# Patient Record
Sex: FEMALE | Race: OTHER | Employment: UNEMPLOYED | URBAN - METROPOLITAN AREA
[De-identification: names, ages, dates, MRNs, and addresses within clinical notes are randomized per-mention and may not be internally consistent; named-entity substitution may affect disease eponyms.]

---
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
## 2018-02-19 ENCOUNTER — HOSPITAL ENCOUNTER (OUTPATIENT)
Age: 18
Discharge: HOME | End: 2018-02-19
Payer: COMMERCIAL

## 2018-02-19 VITALS
HEIGHT: 62 IN | WEIGHT: 135 LBS | RESPIRATION RATE: 18 BRPM | HEART RATE: 101 BPM | OXYGEN SATURATION: 99 % | DIASTOLIC BLOOD PRESSURE: 67 MMHG | BODY MASS INDEX: 24.84 KG/M2 | SYSTOLIC BLOOD PRESSURE: 116 MMHG | TEMPERATURE: 98 F

## 2018-02-19 DIAGNOSIS — K52.9 GASTROENTERITIS: Primary | ICD-10-CM

## 2018-02-19 LAB
B-HCG UR QL: NEGATIVE
S PYO AG THROAT QL: NEGATIVE

## 2018-02-19 PROCEDURE — 81025 URINE PREGNANCY TEST: CPT

## 2018-02-19 PROCEDURE — 99214 OFFICE O/P EST MOD 30 MIN: CPT

## 2018-02-19 PROCEDURE — 87430 STREP A AG IA: CPT

## 2018-02-19 PROCEDURE — 99213 OFFICE O/P EST LOW 20 MIN: CPT

## 2018-02-19 RX ORDER — ONDANSETRON 4 MG/1
4 TABLET, ORALLY DISINTEGRATING ORAL ONCE
Status: COMPLETED | OUTPATIENT
Start: 2018-02-19 | End: 2018-02-19

## 2018-02-19 RX ORDER — ONDANSETRON 4 MG/1
4 TABLET, ORALLY DISINTEGRATING ORAL EVERY 8 HOURS PRN
Qty: 10 TABLET | Refills: 0 | Status: SHIPPED | OUTPATIENT
Start: 2018-02-19 | End: 2018-02-26

## 2018-02-19 NOTE — ED INITIAL ASSESSMENT (HPI)
Pt reporting nausea/vomiting starting last noc. States emesis x3 with last being approximately 30 min ago. States she had a sore throat and cough last week. Denies fever.

## 2018-02-19 NOTE — ED PROVIDER NOTES
Patient Seen in: [facility]    History   Patient presents with:  Nausea/vomiting  Headache    Stated Complaint: VOMITING    HPI    Patient is an otherwise healthy 64-year-old female who presents for evaluation of nausea a 98.2 °F (36.8 °C)  Temp src: Oral  SpO2: 99 %  O2 Device: None (Room air)    Current:/67   Pulse 101   Temp 98.2 °F (36.8 °C) (Oral)   Resp 18   Ht 157.5 cm (5' 2\")   Wt 61.2 kg   LMP 02/11/2018   SpO2 99%   Breastfeeding?  Yes   BMI 24.69 kg/m² because her boyfriend has to go to work. Zofran given here for nausea. Patient requesting discharge papers so she can leave. Given good ER precautions for any worsening symptoms. All questions answered prior to discharge.   Recommended close PCP follow-

## 2018-10-13 ENCOUNTER — OFFICE VISIT (OUTPATIENT)
Dept: OBGYN CLINIC | Facility: CLINIC | Age: 18
End: 2018-10-13
Payer: COMMERCIAL

## 2018-10-13 DIAGNOSIS — N92.6 MISSED MENSES: Primary | ICD-10-CM

## 2018-10-15 ENCOUNTER — TELEPHONE (OUTPATIENT)
Dept: OBGYN CLINIC | Facility: CLINIC | Age: 18
End: 2018-10-15

## 2018-10-15 PROBLEM — N89.8 VAGINAL DISCHARGE: Status: RESOLVED | Noted: 2017-03-29 | Resolved: 2018-10-15

## 2018-10-15 PROBLEM — O09.899 HIGH RISK TEEN PREGNANCY: Status: RESOLVED | Noted: 2017-05-01 | Resolved: 2018-10-15

## 2018-10-15 PROBLEM — Z87.51 HISTORY OF PRETERM DELIVERY: Status: ACTIVE | Noted: 2018-10-15

## 2018-10-15 PROBLEM — Z37.9 NORMAL LABOR: Status: RESOLVED | Noted: 2017-10-29 | Resolved: 2018-10-15

## 2018-10-15 PROBLEM — O09.899 HIGH RISK TEEN PREGNANCY (HCC): Status: RESOLVED | Noted: 2017-05-01 | Resolved: 2018-10-15

## 2018-10-15 PROBLEM — Z34.90 PREGNANCY: Status: RESOLVED | Noted: 2017-10-26 | Resolved: 2018-10-15

## 2018-10-15 PROBLEM — N92.0 SPOTTING: Status: RESOLVED | Noted: 2017-02-25 | Resolved: 2018-10-15

## 2018-10-15 PROBLEM — Z37.9 NORMAL LABOR (HCC): Status: RESOLVED | Noted: 2017-10-29 | Resolved: 2018-10-15

## 2018-10-15 PROBLEM — R10.2 PELVIC PAIN: Status: RESOLVED | Noted: 2017-02-25 | Resolved: 2018-10-15

## 2018-10-15 PROBLEM — Z34.90 PREGNANCY (HCC): Status: RESOLVED | Noted: 2017-10-26 | Resolved: 2018-10-15

## 2018-10-15 NOTE — PROGRESS NOTES
18 yo  here at 20 weeks. Just moved from [place] because her family lives here  Desires Midwifery care. Pt denies any medical conditions. First birth complicated by  PROM at 36+5 EGA. [facility]

## 2018-10-23 ENCOUNTER — TELEPHONE (OUTPATIENT)
Dept: OBGYN CLINIC | Facility: CLINIC | Age: 18
End: 2018-10-23

## 2018-10-23 ENCOUNTER — NURSE ONLY (OUTPATIENT)
Dept: OBGYN CLINIC | Facility: CLINIC | Age: 18
End: 2018-10-23
Payer: COMMERCIAL

## 2018-10-23 VITALS — HEIGHT: 61.5 IN | BODY MASS INDEX: 25.84 KG/M2 | WEIGHT: 138.63 LBS

## 2018-10-23 DIAGNOSIS — O09.899 HIGH RISK TEEN PREGNANCY, ANTEPARTUM: Primary | ICD-10-CM

## 2018-10-23 DIAGNOSIS — O09.219 PREVIOUS PRETERM DELIVERY, ANTEPARTUM: ICD-10-CM

## 2018-10-23 PROCEDURE — 99211 OFF/OP EST MAY X REQ PHY/QHP: CPT | Performed by: ADVANCED PRACTICE MIDWIFE

## 2018-10-23 RX ORDER — CHOLECALCIFEROL (VITAMIN D3) 25 MCG
1 TABLET,CHEWABLE ORAL DAILY
COMMUNITY
End: 2019-12-04

## 2018-10-23 NOTE — PROGRESS NOTES
Nurse education complete & information given to pt. Pt had one PN visit in [place]. Records reviewed, results entered & verified. U/S done at 17 2/7 weeks. HCV ordered. Pt moved here 3 weeks ago to be closer to family. Pt currently involved w/ [facility].

## 2018-10-24 ENCOUNTER — INITIAL PRENATAL (OUTPATIENT)
Dept: OBGYN CLINIC | Facility: CLINIC | Age: 18
End: 2018-10-24
Payer: COMMERCIAL

## 2018-10-24 VITALS
HEART RATE: 92 BPM | WEIGHT: 138 LBS | SYSTOLIC BLOOD PRESSURE: 118 MMHG | DIASTOLIC BLOOD PRESSURE: 75 MMHG | BODY MASS INDEX: 26 KG/M2

## 2018-10-24 DIAGNOSIS — Z34.82 ENCOUNTER FOR SUPERVISION OF OTHER NORMAL PREGNANCY IN SECOND TRIMESTER: Primary | ICD-10-CM

## 2018-10-24 PROBLEM — O09.32 INSUFFICIENT PRENATAL CARE IN SECOND TRIMESTER: Status: ACTIVE | Noted: 2018-10-24

## 2018-10-24 PROBLEM — O09.32 INSUFFICIENT PRENATAL CARE IN SECOND TRIMESTER (HCC): Status: ACTIVE | Noted: 2018-10-24

## 2018-10-24 PROBLEM — O09.899 SHORT INTERVAL BETWEEN PREGNANCIES AFFECTING PREGNANCY, ANTEPARTUM (HCC): Status: ACTIVE | Noted: 2018-10-24

## 2018-10-24 PROBLEM — O09.899 SHORT INTERVAL BETWEEN PREGNANCIES AFFECTING PREGNANCY, ANTEPARTUM: Status: ACTIVE | Noted: 2018-10-24

## 2018-10-24 PROCEDURE — 81002 URINALYSIS NONAUTO W/O SCOPE: CPT | Performed by: ADVANCED PRACTICE MIDWIFE

## 2018-10-24 PROCEDURE — 90686 IIV4 VACC NO PRSV 0.5 ML IM: CPT | Performed by: ADVANCED PRACTICE MIDWIFE

## 2018-10-24 PROCEDURE — 90471 IMMUNIZATION ADMIN: CPT | Performed by: ADVANCED PRACTICE MIDWIFE

## 2018-10-24 NOTE — PROGRESS NOTES
NOB labs reviewed  Records reviewed   Consultation with MFM for hx of PTB and U/S  Warning signs reviewed.   All questions answered

## 2018-10-25 NOTE — PROGRESS NOTES
Outpatient Maternal-Fetal Medicine Consultation    Dear Ms. Fabian Alvarado    Thank you for requesting ultrasound evaluation and maternal fetal medicine consultation on your patient Yue Keenan.   As you are aware she is a 19year old female  with a General: alert and oriented,no acute distress  Abdomen: gravid, soft, non-tender  Extremities: non-tender, no edema    OBSTETRIC ULTRASOUND  The patient had a cervical length assessment today with a normal cervical length (3.9 cm) without cervical funnel Risk factors associated with spontaneous  labor and delivery include maternal medical or obstetrical conditions and demographic variables such as age, race, employment, and socioeconomic status.    Multiple births are six times as likely to be pr factor for  birth. The highest risk of  birth appears to be with interpregnancy interval of less than six months. We discussed the morbidity and mortality associated with prematurity at various gestational ages.   The signs and

## 2018-10-29 ENCOUNTER — TELEPHONE (OUTPATIENT)
Dept: OBGYN CLINIC | Facility: CLINIC | Age: 18
End: 2018-10-29

## 2018-10-29 ENCOUNTER — HOSPITAL ENCOUNTER (OUTPATIENT)
Dept: PERINATAL CARE | Facility: HOSPITAL | Age: 18
Discharge: HOME | End: 2018-10-29
Attending: ADVANCED PRACTICE MIDWIFE
Payer: COMMERCIAL

## 2018-10-29 ENCOUNTER — HOSPITAL ENCOUNTER (OUTPATIENT)
Dept: PERINATAL CARE | Facility: HOSPITAL | Age: 18
Discharge: HOME | End: 2018-10-29
Attending: OBSTETRICS & GYNECOLOGY
Payer: COMMERCIAL

## 2018-10-29 VITALS
HEART RATE: 96 BPM | SYSTOLIC BLOOD PRESSURE: 127 MMHG | WEIGHT: 138 LBS | HEIGHT: 61.5 IN | BODY MASS INDEX: 25.72 KG/M2 | DIASTOLIC BLOOD PRESSURE: 71 MMHG

## 2018-10-29 DIAGNOSIS — Z87.51 HISTORY OF PRETERM DELIVERY: ICD-10-CM

## 2018-10-29 DIAGNOSIS — O09.899 SHORT INTERVAL BETWEEN PREGNANCIES AFFECTING PREGNANCY, ANTEPARTUM: ICD-10-CM

## 2018-10-29 DIAGNOSIS — Z87.51 HISTORY OF PRETERM DELIVERY: Primary | ICD-10-CM

## 2018-10-29 PROCEDURE — 76817 TRANSVAGINAL US OBSTETRIC: CPT | Performed by: OBSTETRICS & GYNECOLOGY

## 2018-10-29 PROCEDURE — 99241 OFFICE CONSULTATION,LEVEL I: CPT | Performed by: OBSTETRICS & GYNECOLOGY

## 2018-10-29 NOTE — TELEPHONE ENCOUNTER
[facility] call from [facility] stating pt has appt for todady at 11 AM for MFM consult and US for Cervical Length due to history of  delivery. [facility] is requesting order for Level II and Prior Auth.   [facility] was advised the order and  the Prior Auth pt has is for C

## 2018-11-21 ENCOUNTER — ROUTINE PRENATAL (OUTPATIENT)
Dept: OBGYN CLINIC | Facility: CLINIC | Age: 18
End: 2018-11-21
Payer: COMMERCIAL

## 2018-11-21 VITALS
SYSTOLIC BLOOD PRESSURE: 115 MMHG | HEART RATE: 94 BPM | BODY MASS INDEX: 26 KG/M2 | DIASTOLIC BLOOD PRESSURE: 70 MMHG | WEIGHT: 142 LBS

## 2018-11-21 DIAGNOSIS — Z34.82 ENCOUNTER FOR SUPERVISION OF OTHER NORMAL PREGNANCY IN SECOND TRIMESTER: Primary | ICD-10-CM

## 2018-11-21 PROCEDURE — 81002 URINALYSIS NONAUTO W/O SCOPE: CPT | Performed by: ADVANCED PRACTICE MIDWIFE

## 2018-11-26 ENCOUNTER — APPOINTMENT (OUTPATIENT)
Dept: LAB | Age: 18
End: 2018-11-26
Attending: ADVANCED PRACTICE MIDWIFE
Payer: COMMERCIAL

## 2018-11-26 DIAGNOSIS — O09.899 HIGH RISK TEEN PREGNANCY, ANTEPARTUM: ICD-10-CM

## 2018-11-26 DIAGNOSIS — O09.219 PREVIOUS PRETERM DELIVERY, ANTEPARTUM: ICD-10-CM

## 2018-11-26 DIAGNOSIS — Z34.82 ENCOUNTER FOR SUPERVISION OF OTHER NORMAL PREGNANCY IN SECOND TRIMESTER: ICD-10-CM

## 2018-11-26 PROCEDURE — 86803 HEPATITIS C AB TEST: CPT

## 2018-11-26 PROCEDURE — 86780 TREPONEMA PALLIDUM: CPT

## 2018-11-26 PROCEDURE — 36415 COLL VENOUS BLD VENIPUNCTURE: CPT

## 2018-11-26 PROCEDURE — 85027 COMPLETE CBC AUTOMATED: CPT

## 2018-11-26 PROCEDURE — 87389 HIV-1 AG W/HIV-1&-2 AB AG IA: CPT

## 2018-11-26 PROCEDURE — 82950 GLUCOSE TEST: CPT

## 2018-12-03 ENCOUNTER — HOSPITAL ENCOUNTER (OUTPATIENT)
Dept: PERINATAL CARE | Facility: HOSPITAL | Age: 18
Discharge: HOME | End: 2018-12-03
Attending: OBSTETRICS & GYNECOLOGY
Payer: COMMERCIAL

## 2018-12-03 VITALS
HEIGHT: 61.5 IN | HEART RATE: 86 BPM | WEIGHT: 142 LBS | DIASTOLIC BLOOD PRESSURE: 78 MMHG | SYSTOLIC BLOOD PRESSURE: 116 MMHG | BODY MASS INDEX: 26.47 KG/M2

## 2018-12-03 DIAGNOSIS — Z36.3 ENCOUNTER FOR ANTENATAL SCREENING FOR MALFORMATION: ICD-10-CM

## 2018-12-03 DIAGNOSIS — Z87.51 HISTORY OF PRETERM DELIVERY: Primary | ICD-10-CM

## 2018-12-03 DIAGNOSIS — Z87.51 HISTORY OF PRETERM DELIVERY: ICD-10-CM

## 2018-12-03 PROCEDURE — 99243 OFF/OP CNSLTJ NEW/EST LOW 30: CPT | Performed by: OBSTETRICS & GYNECOLOGY

## 2018-12-03 PROCEDURE — 76811 OB US DETAILED SNGL FETUS: CPT | Performed by: OBSTETRICS & GYNECOLOGY

## 2018-12-03 NOTE — PROGRESS NOTES
Outpatient Maternal-Fetal Medicine Follow-Up    Dear Ms. Anh Sherman    Thank you for requesting ultrasound evaluation and maternal fetal medicine consultation on your patient Love Gong.   As you are aware she is a 19year old female  with a s if additional questions or concerns arise. Kat Albarran. Farrah Simmons M.D. The majority of the time (>50%) was spent in review of records, consultation and coordination of care. Our discussion is summarized above.  The approximate physician face-to-face time

## 2018-12-19 ENCOUNTER — ROUTINE PRENATAL (OUTPATIENT)
Dept: OBGYN CLINIC | Facility: CLINIC | Age: 18
End: 2018-12-19
Payer: COMMERCIAL

## 2018-12-19 VITALS
WEIGHT: 150 LBS | HEART RATE: 90 BPM | SYSTOLIC BLOOD PRESSURE: 113 MMHG | DIASTOLIC BLOOD PRESSURE: 72 MMHG | BODY MASS INDEX: 28 KG/M2

## 2018-12-19 DIAGNOSIS — Z34.83 ENCOUNTER FOR SUPERVISION OF OTHER NORMAL PREGNANCY IN THIRD TRIMESTER: Primary | ICD-10-CM

## 2018-12-19 PROCEDURE — 81002 URINALYSIS NONAUTO W/O SCOPE: CPT | Performed by: ADVANCED PRACTICE MIDWIFE

## 2018-12-19 NOTE — PROGRESS NOTES
Active fetus  No signs signs of PTL. Reviewed S&S of PTL Discussed with pt how to apply for Link. Also discussed [facility] applying for [facility] letter of confirmation Aristides Last. Warning signs reviewed  All questions answered.

## 2019-01-16 ENCOUNTER — ROUTINE PRENATAL (OUTPATIENT)
Dept: OBGYN CLINIC | Facility: CLINIC | Age: 19
End: 2019-01-16
Payer: COMMERCIAL

## 2019-01-16 VITALS
DIASTOLIC BLOOD PRESSURE: 71 MMHG | HEART RATE: 87 BPM | BODY MASS INDEX: 28.42 KG/M2 | HEIGHT: 61.5 IN | WEIGHT: 152.5 LBS | SYSTOLIC BLOOD PRESSURE: 109 MMHG

## 2019-01-16 DIAGNOSIS — Z34.83 PRENATAL CARE, SUBSEQUENT PREGNANCY, THIRD TRIMESTER: Primary | ICD-10-CM

## 2019-01-16 LAB
APPEARANCE: CLEAR
MULTISTIX LOT#: NORMAL NUMERIC
PH, URINE: 7 (ref 4.5–8)
SPECIFIC GRAVITY: 1.02 (ref 1–1.03)
URINE-COLOR: YELLOW
UROBILINOGEN,SEMI-QN: 0.2 MG/DL (ref 0–1.9)

## 2019-01-16 PROCEDURE — 81002 URINALYSIS NONAUTO W/O SCOPE: CPT | Performed by: ADVANCED PRACTICE MIDWIFE

## 2019-01-16 NOTE — PROGRESS NOTES
Active baby. Denies any vagina bleeding or leaking of fluid. Had a small amount of bright red blood from vagina 2 days while urinating. Resolved and no further bleeding. Denies any associated abdominal pain or cramping. Denies any urinary s/s.  Pt states

## 2019-01-28 ENCOUNTER — HOSPITAL ENCOUNTER (OUTPATIENT)
Facility: HOSPITAL | Age: 19
Setting detail: OBSERVATION
Discharge: HOME | End: 2019-01-29
Attending: ADVANCED PRACTICE MIDWIFE | Admitting: OBSTETRICS & GYNECOLOGY
Payer: COMMERCIAL

## 2019-01-28 ENCOUNTER — ROUTINE PRENATAL (OUTPATIENT)
Dept: OBGYN CLINIC | Facility: CLINIC | Age: 19
End: 2019-01-28
Payer: COMMERCIAL

## 2019-01-28 VITALS
SYSTOLIC BLOOD PRESSURE: 102 MMHG | BODY MASS INDEX: 28.19 KG/M2 | DIASTOLIC BLOOD PRESSURE: 69 MMHG | HEIGHT: 61.5 IN | WEIGHT: 151.25 LBS | HEART RATE: 97 BPM

## 2019-01-28 DIAGNOSIS — Z34.83 PRENATAL CARE, SUBSEQUENT PREGNANCY, THIRD TRIMESTER: Primary | ICD-10-CM

## 2019-01-28 PROBLEM — Z34.90 PREGNANCY: Status: ACTIVE | Noted: 2019-01-28

## 2019-01-28 PROBLEM — Z34.90 PREGNANCY (HCC): Status: ACTIVE | Noted: 2019-01-28

## 2019-01-28 PROBLEM — O47.03 PRETERM UTERINE CONTRACTIONS IN THIRD TRIMESTER, ANTEPARTUM: Status: ACTIVE | Noted: 2019-01-28

## 2019-01-28 PROBLEM — O47.03 PRETERM UTERINE CONTRACTIONS IN THIRD TRIMESTER, ANTEPARTUM (HCC): Status: ACTIVE | Noted: 2019-01-28

## 2019-01-28 LAB
ANTIBODY SCREEN: NEGATIVE
BASOPHILS # BLD: 0 K/UL (ref 0–0.2)
BASOPHILS NFR BLD: 0 %
BILIRUB UR QL: NEGATIVE
CLARITY UR: CLEAR
COLOR UR: YELLOW
EOSINOPHIL # BLD: 0 K/UL (ref 0–0.7)
EOSINOPHIL NFR BLD: 0 %
ERYTHROCYTE [DISTWIDTH] IN BLOOD BY AUTOMATED COUNT: 12.1 % (ref 11–15)
GLUCOSE UR-MCNC: NEGATIVE MG/DL
HCT VFR BLD AUTO: 38.6 % (ref 35–48)
HGB BLD-MCNC: 13.4 G/DL (ref 12–16)
HGB UR QL STRIP.AUTO: NEGATIVE
KETONES UR-MCNC: NEGATIVE MG/DL
LYMPHOCYTES # BLD: 1.3 K/UL (ref 1–4)
LYMPHOCYTES NFR BLD: 13 %
MCH RBC QN AUTO: 31.4 PG (ref 27–32)
MCHC RBC AUTO-ENTMCNC: 34.6 G/DL (ref 32–37)
MCV RBC AUTO: 90.7 FL (ref 80–100)
MONOCYTES # BLD: 0.4 K/UL (ref 0–1)
MONOCYTES NFR BLD: 4 %
NEUTROPHILS # BLD AUTO: 8.2 K/UL (ref 1.8–7.7)
NEUTROPHILS NFR BLD: 83 %
NITRITE UR QL STRIP.AUTO: NEGATIVE
PH UR: 5 [PH] (ref 5–8)
PLATELET # BLD AUTO: 230 K/UL (ref 140–400)
PMV BLD AUTO: 9.6 FL (ref 7.4–10.3)
PROT UR-MCNC: NEGATIVE MG/DL
RBC # BLD AUTO: 4.25 M/UL (ref 3.7–5.4)
RBC #/AREA URNS AUTO: 3 /HPF
RH BLOOD TYPE: POSITIVE
SP GR UR STRIP: 1.02 (ref 1–1.03)
UROBILINOGEN UR STRIP-ACNC: <2
VIT C UR-MCNC: NEGATIVE MG/DL
WBC # BLD AUTO: 10 K/UL (ref 4–11)
WBC #/AREA URNS AUTO: 8 /HPF

## 2019-01-28 PROCEDURE — 81002 URINALYSIS NONAUTO W/O SCOPE: CPT | Performed by: ADVANCED PRACTICE MIDWIFE

## 2019-01-28 PROCEDURE — 99219 INITIAL OBSERVATION CARE,LEVL II: CPT | Performed by: ADVANCED PRACTICE MIDWIFE

## 2019-01-28 RX ORDER — SODIUM CHLORIDE, SODIUM LACTATE, POTASSIUM CHLORIDE, CALCIUM CHLORIDE 600; 310; 30; 20 MG/100ML; MG/100ML; MG/100ML; MG/100ML
INJECTION, SOLUTION INTRAVENOUS CONTINUOUS
Status: DISCONTINUED | OUTPATIENT
Start: 2019-01-28 | End: 2019-01-29

## 2019-01-28 RX ORDER — NIFEDIPINE 10 MG/1
CAPSULE ORAL
Status: COMPLETED
Start: 2019-01-28 | End: 2019-01-28

## 2019-01-28 RX ORDER — SODIUM CHLORIDE, SODIUM LACTATE, POTASSIUM CHLORIDE, CALCIUM CHLORIDE 600; 310; 30; 20 MG/100ML; MG/100ML; MG/100ML; MG/100ML
INJECTION, SOLUTION INTRAVENOUS
Status: DISPENSED
Start: 2019-01-28 | End: 2019-01-29

## 2019-01-28 RX ORDER — SODIUM CHLORIDE 0.9 % (FLUSH) 0.9 %
10 SYRINGE (ML) INJECTION AS NEEDED
Status: DISCONTINUED | OUTPATIENT
Start: 2019-01-28 | End: 2019-01-29

## 2019-01-28 RX ORDER — ZOLPIDEM TARTRATE 5 MG/1
5 TABLET ORAL NIGHTLY PRN
Status: DISCONTINUED | OUTPATIENT
Start: 2019-01-28 | End: 2019-01-29

## 2019-01-28 RX ORDER — SODIUM CHLORIDE, SODIUM LACTATE, POTASSIUM CHLORIDE, CALCIUM CHLORIDE 600; 310; 30; 20 MG/100ML; MG/100ML; MG/100ML; MG/100ML
INJECTION, SOLUTION INTRAVENOUS
Status: COMPLETED
Start: 2019-01-28 | End: 2019-01-28

## 2019-01-28 RX ORDER — BETAMETHASONE SODIUM PHOSPHATE AND BETAMETHASONE ACETATE 3; 3 MG/ML; MG/ML
INJECTION, SUSPENSION INTRA-ARTICULAR; INTRALESIONAL; INTRAMUSCULAR; SOFT TISSUE
Status: COMPLETED
Start: 2019-01-28 | End: 2019-01-28

## 2019-01-28 RX ORDER — ACETAMINOPHEN 500 MG
500 TABLET ORAL EVERY 6 HOURS PRN
Status: DISCONTINUED | OUTPATIENT
Start: 2019-01-28 | End: 2019-01-29

## 2019-01-28 RX ORDER — NIFEDIPINE 10 MG/1
20 CAPSULE ORAL EVERY 6 HOURS
Status: DISCONTINUED | OUTPATIENT
Start: 2019-01-28 | End: 2019-01-29

## 2019-01-28 RX ORDER — NIFEDIPINE 10 MG/1
10 CAPSULE ORAL
Status: ACTIVE | OUTPATIENT
Start: 2019-01-28 | End: 2019-01-28

## 2019-01-28 RX ORDER — DOCUSATE SODIUM 100 MG/1
100 CAPSULE, LIQUID FILLED ORAL 2 TIMES DAILY
Status: DISCONTINUED | OUTPATIENT
Start: 2019-01-28 | End: 2019-01-29

## 2019-01-28 RX ORDER — NIFEDIPINE 10 MG/1
CAPSULE ORAL
Status: DISCONTINUED
Start: 2019-01-28 | End: 2019-01-29

## 2019-01-28 RX ORDER — BETAMETHASONE SODIUM PHOSPHATE AND BETAMETHASONE ACETATE 3; 3 MG/ML; MG/ML
12 INJECTION, SUSPENSION INTRA-ARTICULAR; INTRALESIONAL; INTRAMUSCULAR; SOFT TISSUE EVERY 24 HOURS
Status: COMPLETED | OUTPATIENT
Start: 2019-01-28 | End: 2019-01-29

## 2019-01-28 RX ORDER — CALCIUM CARBONATE 200(500)MG
1000 TABLET,CHEWABLE ORAL
Status: DISCONTINUED | OUTPATIENT
Start: 2019-01-28 | End: 2019-01-29

## 2019-01-28 RX ORDER — ACETAMINOPHEN 500 MG
1000 TABLET ORAL EVERY 6 HOURS PRN
Status: DISCONTINUED | OUTPATIENT
Start: 2019-01-28 | End: 2019-01-29

## 2019-01-28 NOTE — PROGRESS NOTES
Pt ambulatory to triage rm 3   Pt  hx of ptl. Sent from office d/t c/o lower pelvic pressure/back pain. Fetal tachycardia in office per CNM.  Sent in for r/o contractions and nst

## 2019-01-28 NOTE — PROGRESS NOTES
Reports pelvic pressure and some lower menstrual like cramps earlier today that have now resolved. Reports losing her mucus plug, denies LOF or bleeding. Denies regular contractions or BH. Reports active baby.   SVE posterior 1/75/-1, -190s with v

## 2019-01-29 VITALS
TEMPERATURE: 98 F | RESPIRATION RATE: 16 BRPM | SYSTOLIC BLOOD PRESSURE: 119 MMHG | DIASTOLIC BLOOD PRESSURE: 68 MMHG | HEART RATE: 99 BPM

## 2019-01-29 LAB
C TRACH DNA SPEC QL NAA+PROBE: NEGATIVE
N GONORRHOEA DNA SPEC QL NAA+PROBE: NEGATIVE

## 2019-01-29 PROCEDURE — 99217 OBSERVATION CARE DISCHARGE: CPT | Performed by: ADVANCED PRACTICE MIDWIFE

## 2019-01-29 RX ORDER — SODIUM CHLORIDE, SODIUM LACTATE, POTASSIUM CHLORIDE, CALCIUM CHLORIDE 600; 310; 30; 20 MG/100ML; MG/100ML; MG/100ML; MG/100ML
INJECTION, SOLUTION INTRAVENOUS
Status: DISCONTINUED
Start: 2019-01-29 | End: 2019-01-29 | Stop reason: WASHOUT

## 2019-01-29 RX ORDER — SODIUM CHLORIDE, SODIUM LACTATE, POTASSIUM CHLORIDE, CALCIUM CHLORIDE 600; 310; 30; 20 MG/100ML; MG/100ML; MG/100ML; MG/100ML
INJECTION, SOLUTION INTRAVENOUS
Status: COMPLETED
Start: 2019-01-29 | End: 2019-01-29

## 2019-01-29 NOTE — PROGRESS NOTES
[facility]    OB/GYNE Progress Note      Whit Garcia Patient Status:  Observation    10/9/2000 MRN [id number]   Location [address]  Attending Yanni Montes, [facility] Day # 0 PCP Lissett Benjamin MD 05/03/2017    ABO O 01/28/2019    RH Positive 01/28/2019    WBC 10.0 01/28/2019    HGB 13.4 01/28/2019    HCT 38.6 01/28/2019     01/28/2019       Lab Results   Component Value Date    COLORUR Yellow 01/28/2019    CLARITY Clear 01/28/2019    SPECGRA

## 2019-01-29 NOTE — CONSULTS
[facility]    Maternal-Fetal Medicine Inpatient Consultation    Date of Admission:  2019  Date of Consult:  2019    Reason for Consult:   Threatened  Labor    History of Present Illness:  Edvin Herrera is a a(n) 18 yesiri Facility-Administered Medications:   •  lactated ringers infusion, , Intravenous, Continuous  •  Normal Saline Flush 0.9 % injection 10 mL, 10 mL, Intravenous, PRN  •  lactated ringers infusion, , Intravenous, Continuous  •  acetaminophen ([address] approximately 50% of these  births.  Although the causes of  labor are not well understood, the burden of  births is clear— births account for approximately 70% of  deaths and 36% of infant deaths as well as 25–50% of ainsley prevent  births in women with  labor have included bed rest, abstention from intercourse and orgasm, and hydration. Evidence for the effectiveness of these interventions is lacking, and adverse effects have been reported.  therapeutic agents c administration)  · Maternal contraindications to tocolysis (agent specific)     contractions are common; however, these contractions do not reliably predict which women will have subsequent progressive cervical change.  In a study of 763 women who yanez regularly scheduled repeat courses or multiple courses (more than two) are not currently recommended.     Because treatment with corticosteroids for less than 24 hours is still associated with significant reductions in  morbidity and mortality, a fi benefit. The use of magnesium sulfate to inhibit acute  labor has similar limitations when used for pregnancy prolongation.   However, if magnesium sulfate is being used in the context of  labor for fetal neuroprotection and the patient is shown to be effective for the prevention of  birth and should not be routinely recommended.  Furthermore, the potential harm, including venous thromboembolism, bone demineralization, and deconditioning, and the negative effects, such as loss of emplo

## 2019-01-29 NOTE — DISCHARGE SUMMARY
[facility]    Discharge Summary    Love Gong Patient Status:  Observation    10/9/2000 MRN [id number]   Location [facility] Attending Shaw Mendoza, [address] Day # 0       EDC: Estimated Date of Deli

## 2019-01-29 NOTE — PROGRESS NOTES
2nd dose of betamethasone administered to right lateral vastus lateralis. Pt tolerated well. Discharged to home per ambulatory in stable condition with written and verbal instructions. Patient verbalizes understanding of information given.

## 2019-01-29 NOTE — H&P
[facility] Patient Status:  Observation    10/9/2000 MRN [id number]   Location [address] [place] [address] Attending Aaron Payton, [address] Day # 0 PCP Dereck Rubinstein, MD     Date of Grandmother    • Other (Other) Maternal Grandfather         arthritis   • Heart Disorder Paternal Grandfather    • Heart Attack Neg         family h/o Sudden cardiac death     Social History: Social History    Tobacco Use      Smoking status: Never Smoker 01/28/2019    UASA Negative 01/28/2019               Assessment/Plan:    Srinivasa Gaston is at an estimated gestational age of 32w1d with an estimated date of delivery of:  3/2/2019, by Last Menstrual Period      Obstetrical history significant for  pre

## 2019-01-30 LAB — T PALLIDUM AB SER QL: NEGATIVE

## 2019-02-04 ENCOUNTER — ROUTINE PRENATAL (OUTPATIENT)
Dept: OBGYN CLINIC | Facility: CLINIC | Age: 19
End: 2019-02-04
Payer: COMMERCIAL

## 2019-02-04 VITALS
BODY MASS INDEX: 28.35 KG/M2 | DIASTOLIC BLOOD PRESSURE: 73 MMHG | HEART RATE: 93 BPM | SYSTOLIC BLOOD PRESSURE: 108 MMHG | HEIGHT: 61.5 IN | WEIGHT: 152.13 LBS

## 2019-02-04 DIAGNOSIS — Z34.83 PRENATAL CARE, SUBSEQUENT PREGNANCY, THIRD TRIMESTER: Primary | ICD-10-CM

## 2019-02-04 LAB
APPEARANCE: CLEAR
APPEARANCE: CLEAR
MULTISTIX LOT#: NORMAL NUMERIC
MULTISTIX LOT#: NORMAL NUMERIC
PH, URINE: 5 (ref 4.5–8)
PH, URINE: 6 (ref 4.5–8)
SPECIFIC GRAVITY: 1 (ref 1–1.03)
SPECIFIC GRAVITY: 1.02 (ref 1–1.03)
URINE-COLOR: YELLOW
URINE-COLOR: YELLOW
UROBILINOGEN,SEMI-QN: 0.2 MG/DL (ref 0–1.9)
UROBILINOGEN,SEMI-QN: 0.2 MG/DL (ref 0–1.9)

## 2019-02-04 PROCEDURE — 81002 URINALYSIS NONAUTO W/O SCOPE: CPT | Performed by: ADVANCED PRACTICE MIDWIFE

## 2019-02-04 NOTE — PROGRESS NOTES
Active baby. Feeling well but tired. lower back pain and abdominal cramping resolved. Denies any vaginal bleeding or leaking of fluids. Denies BH. GBS neg, urine culture neg. Denies any urinary s/s. Taking prenatal classes weekly.  Birth plan reviewed; wou

## 2019-02-12 ENCOUNTER — ROUTINE PRENATAL (OUTPATIENT)
Dept: OBGYN CLINIC | Facility: CLINIC | Age: 19
End: 2019-02-12
Payer: COMMERCIAL

## 2019-02-12 VITALS
DIASTOLIC BLOOD PRESSURE: 70 MMHG | SYSTOLIC BLOOD PRESSURE: 118 MMHG | WEIGHT: 154 LBS | BODY MASS INDEX: 29 KG/M2 | HEART RATE: 80 BPM

## 2019-02-12 DIAGNOSIS — Z34.83 ENCOUNTER FOR SUPERVISION OF OTHER NORMAL PREGNANCY IN THIRD TRIMESTER: Primary | ICD-10-CM

## 2019-02-12 LAB
APPEARANCE: CLEAR
MULTISTIX LOT#: NORMAL NUMERIC
PH, URINE: 8 (ref 4.5–8)
SPECIFIC GRAVITY: 1 (ref 1–1.03)
URINE-COLOR: YELLOW
UROBILINOGEN,SEMI-QN: 0 MG/DL (ref 0–1.9)

## 2019-02-12 PROCEDURE — 81002 URINALYSIS NONAUTO W/O SCOPE: CPT | Performed by: ADVANCED PRACTICE MIDWIFE

## 2019-02-12 NOTE — PROGRESS NOTES
Baby active. No SOL. Plans routine baby meds. SOL and warning signs reviewed. Last baby PROM, had Pitocin 6 hrs after and then delivered 6 hr after Pitocin started.

## 2019-02-19 ENCOUNTER — ROUTINE PRENATAL (OUTPATIENT)
Dept: OBGYN CLINIC | Facility: CLINIC | Age: 19
End: 2019-02-19
Payer: COMMERCIAL

## 2019-02-19 ENCOUNTER — HOSPITAL ENCOUNTER (OUTPATIENT)
Facility: HOSPITAL | Age: 19
Setting detail: OBSERVATION
Discharge: HOME | End: 2019-02-19
Attending: ADVANCED PRACTICE MIDWIFE | Admitting: OBSTETRICS & GYNECOLOGY
Payer: COMMERCIAL

## 2019-02-19 VITALS
DIASTOLIC BLOOD PRESSURE: 72 MMHG | SYSTOLIC BLOOD PRESSURE: 106 MMHG | HEART RATE: 92 BPM | WEIGHT: 154 LBS | BODY MASS INDEX: 29 KG/M2

## 2019-02-19 VITALS
HEIGHT: 62 IN | HEART RATE: 117 BPM | SYSTOLIC BLOOD PRESSURE: 112 MMHG | RESPIRATION RATE: 18 BRPM | WEIGHT: 151 LBS | DIASTOLIC BLOOD PRESSURE: 58 MMHG | BODY MASS INDEX: 27.79 KG/M2 | TEMPERATURE: 99 F

## 2019-02-19 DIAGNOSIS — Z34.83 ENCOUNTER FOR SUPERVISION OF OTHER NORMAL PREGNANCY IN THIRD TRIMESTER: Primary | ICD-10-CM

## 2019-02-19 PROBLEM — R50.9 FEBRILE ILLNESS: Status: ACTIVE | Noted: 2019-02-19

## 2019-02-19 PROBLEM — E86.0 DEHYDRATION: Status: ACTIVE | Noted: 2019-02-19

## 2019-02-19 PROBLEM — N30.90 CYSTITIS: Status: ACTIVE | Noted: 2019-02-19

## 2019-02-19 LAB
APPEARANCE: CLEAR
BASOPHILS # BLD AUTO: 0.02 X10(3) UL (ref 0–0.2)
BASOPHILS NFR BLD AUTO: 0.2 %
BILIRUB UR QL: NEGATIVE
CLARITY UR: CLEAR
COLOR UR: YELLOW
DEPRECATED RDW RBC AUTO: 38.6 FL (ref 35.1–46.3)
EOSINOPHIL # BLD AUTO: 0 X10(3) UL (ref 0–0.7)
EOSINOPHIL NFR BLD AUTO: 0 %
ERYTHROCYTE [DISTWIDTH] IN BLOOD BY AUTOMATED COUNT: 12 % (ref 11–15)
FLUAV + FLUBV RNA SPEC NAA+PROBE: NEGATIVE
GLUCOSE UR-MCNC: NEGATIVE MG/DL
HCT VFR BLD AUTO: 33.3 % (ref 35–48)
HGB BLD-MCNC: 11.9 G/DL (ref 12–16)
HGB UR QL STRIP.AUTO: NEGATIVE
IMM GRANULOCYTES # BLD AUTO: 0.22 X10(3) UL (ref 0–1)
IMM GRANULOCYTES NFR BLD: 2.2 %
KETONES UR-MCNC: 20 MG/DL
LYMPHOCYTES # BLD AUTO: 0.79 X10(3) UL (ref 1–4)
LYMPHOCYTES NFR BLD AUTO: 7.9 %
MCH RBC QN AUTO: 31.4 PG (ref 26–34)
MCHC RBC AUTO-ENTMCNC: 35.7 G/DL (ref 31–37)
MCV RBC AUTO: 87.9 FL (ref 80–100)
MONOCYTES # BLD AUTO: 0.52 X10(3) UL (ref 0.1–1)
MONOCYTES NFR BLD AUTO: 5.2 %
MULTISTIX LOT#: NORMAL NUMERIC
NEUTROPHILS # BLD AUTO: 8.42 X10 (3) UL (ref 1.5–7.7)
NEUTROPHILS # BLD AUTO: 8.42 X10(3) UL (ref 1.5–7.7)
NEUTROPHILS NFR BLD AUTO: 84.5 %
NITRITE UR QL STRIP.AUTO: NEGATIVE
PH UR: 6 [PH] (ref 5–8)
PH, URINE: 7 (ref 4.5–8)
PLATELET # BLD AUTO: 186 10(3)UL (ref 150–450)
PROT UR-MCNC: NEGATIVE MG/DL
RBC # BLD AUTO: 3.79 X10(6)UL (ref 3.8–5.3)
RBC #/AREA URNS AUTO: 3 /HPF
SP GR UR STRIP: 1.01 (ref 1–1.03)
SPECIFIC GRAVITY: 1.02 (ref 1–1.03)
URINE-COLOR: YELLOW
UROBILINOGEN UR STRIP-ACNC: <2
UROBILINOGEN,SEMI-QN: 0 MG/DL (ref 0–1.9)
VIT C UR-MCNC: NEGATIVE MG/DL
WBC # BLD AUTO: 10 X10(3) UL (ref 4–11)
WBC #/AREA URNS AUTO: 9 /HPF

## 2019-02-19 PROCEDURE — 99219 INITIAL OBSERVATION CARE,LEVL II: CPT | Performed by: ADVANCED PRACTICE MIDWIFE

## 2019-02-19 PROCEDURE — 81002 URINALYSIS NONAUTO W/O SCOPE: CPT | Performed by: ADVANCED PRACTICE MIDWIFE

## 2019-02-19 PROCEDURE — 59025 FETAL NON-STRESS TEST: CPT | Performed by: ADVANCED PRACTICE MIDWIFE

## 2019-02-19 RX ORDER — AMOXICILLIN 500 MG/1
500 CAPSULE ORAL ONCE
Status: DISCONTINUED | OUTPATIENT
Start: 2019-02-19 | End: 2019-02-20

## 2019-02-19 RX ORDER — DEXTROSE, SODIUM CHLORIDE, SODIUM LACTATE, POTASSIUM CHLORIDE, AND CALCIUM CHLORIDE 5; .6; .31; .03; .02 G/100ML; G/100ML; G/100ML; G/100ML; G/100ML
INJECTION, SOLUTION INTRAVENOUS
Status: COMPLETED
Start: 2019-02-19 | End: 2019-02-19

## 2019-02-19 RX ORDER — SODIUM CHLORIDE, SODIUM LACTATE, POTASSIUM CHLORIDE, CALCIUM CHLORIDE 600; 310; 30; 20 MG/100ML; MG/100ML; MG/100ML; MG/100ML
INJECTION, SOLUTION INTRAVENOUS ONCE
Status: COMPLETED | OUTPATIENT
Start: 2019-02-19 | End: 2019-02-19

## 2019-02-19 RX ORDER — AMOXICILLIN 500 MG/1
CAPSULE ORAL
Status: COMPLETED
Start: 2019-02-19 | End: 2019-02-19

## 2019-02-19 RX ORDER — SODIUM CHLORIDE, SODIUM LACTATE, POTASSIUM CHLORIDE, CALCIUM CHLORIDE 600; 310; 30; 20 MG/100ML; MG/100ML; MG/100ML; MG/100ML
INJECTION, SOLUTION INTRAVENOUS
Status: DISCONTINUED
Start: 2019-02-19 | End: 2019-02-20

## 2019-02-19 RX ORDER — ACETAMINOPHEN 500 MG
1000 TABLET ORAL ONCE
Status: COMPLETED | OUTPATIENT
Start: 2019-02-19 | End: 2019-02-19

## 2019-02-19 RX ORDER — ACETAMINOPHEN 500 MG
TABLET ORAL
Status: DISCONTINUED
Start: 2019-02-19 | End: 2019-02-20

## 2019-02-19 RX ORDER — AMOXICILLIN 500 MG/1
500 CAPSULE ORAL 3 TIMES DAILY
Qty: 21 CAPSULE | Refills: 0 | Status: SHIPPED | OUTPATIENT
Start: 2019-02-19 | End: 2019-02-26

## 2019-02-19 RX ORDER — DEXTROSE AND SODIUM CHLORIDE 5; .45 G/100ML; G/100ML
INJECTION, SOLUTION INTRAVENOUS CONTINUOUS
Status: DISCONTINUED | OUTPATIENT
Start: 2019-02-19 | End: 2019-02-20

## 2019-02-20 ENCOUNTER — TELEPHONE (OUTPATIENT)
Dept: OBGYN CLINIC | Facility: CLINIC | Age: 19
End: 2019-02-20

## 2019-02-20 LAB
C TRACH DNA SPEC QL NAA+PROBE: NEGATIVE
N GONORRHOEA DNA SPEC QL NAA+PROBE: NEGATIVE

## 2019-02-20 NOTE — PROGRESS NOTES
Pt  @ 38.3 wks gest ambulatory to triage from [facility] office. Sent by CNM d/t elevated FHT's in office , pt c/o back pain, fever and chills denies s/s UTI, neg flank pain per CNM. Received order for NST, po hydrate and labs. Q&A done.  Pt agrees w/POC,

## 2019-02-20 NOTE — PROGRESS NOTES
Reports low back pain and pressure, constant. Denies dysuria. Urine with + Ketones and moderate leukocytes. Felt similar in labor with 1st baby. Appears sick. No CVA tenderness. Feels warm to touch, but temp normal here.  Pulse 92 by dynamap but manual was

## 2019-02-20 NOTE — TELEPHONE ENCOUNTER
Attempted to call pt to f/u on how feeling, however phone number out of service for both pt and FOB. Sent pt message through [employer].

## 2019-02-20 NOTE — TRIAGE
[facility]      Triage Note    Komal Mccoy Patient Status:  Observation    10/9/2000 MRN [id number]   Location [facility] [facility] Attending Swapna Garcia, [address] Day # 0 PCP MD Quentin Perez Interpretation: Reactive          FHR Category: Category I           Additional Comments Comments: REGAN Nguyen viewed strip and assessed pt, pt discharged home w/ written instructions from provider     Reason for visit:  Fever, backpain    Sabrinaonnie Dwaines

## 2019-02-20 NOTE — H&P
[facility] Patient Status:  Observation    10/9/2000 MRN [id number]   Location [address] [place] [address] Attending [address] Day # 0 Risa Vitale MD • Other (Other) Maternal Grandfather         arthritis   • Heart Disorder Paternal Grandfather    • Heart Attack Neg         family h/o Sudden cardiac death     Social History: Social History    Tobacco Use      Smoking status: Never Smoker      Smokeless PLT  186.0     Influenza A/B Negative  Urine culture and GC/Ct pending    Results:     Prenatal Results     Initial     Test Value Reference Range Date Time    Blood Type (ABO Group) O   01/28/19 2000    Rh Factor Positive   01/28/19 2000    Antibody Scree 35-37 Weeks     Test Value Reference Range Date Time    HCT 33.3 % 35.0 - 48.0 % 02/19/19 1910    HGB 11.9 g/dL 12.0 - 16.0 g/dL 02/19/19 1910    Platelets 003.3 04(4).0 - 450.0 10(3)uL 02/19/19 1910    TREP Negative  Negative 01/28/19 2000

## 2019-02-21 ENCOUNTER — HOSPITAL ENCOUNTER (OUTPATIENT)
Facility: HOSPITAL | Age: 19
Setting detail: OBSERVATION
Discharge: HOME | End: 2019-02-21
Attending: ADVANCED PRACTICE MIDWIFE | Admitting: OBSTETRICS & GYNECOLOGY
Payer: COMMERCIAL

## 2019-02-21 ENCOUNTER — ROUTINE PRENATAL (OUTPATIENT)
Dept: OBGYN CLINIC | Facility: CLINIC | Age: 19
End: 2019-02-21
Payer: COMMERCIAL

## 2019-02-21 ENCOUNTER — HOSPITAL ENCOUNTER (OUTPATIENT)
Dept: PERINATAL CARE | Facility: HOSPITAL | Age: 19
Discharge: HOME | End: 2019-02-21
Attending: OBSTETRICS & GYNECOLOGY
Payer: COMMERCIAL

## 2019-02-21 VITALS
WEIGHT: 158 LBS | HEIGHT: 61 IN | HEART RATE: 104 BPM | BODY MASS INDEX: 29.83 KG/M2 | DIASTOLIC BLOOD PRESSURE: 80 MMHG | SYSTOLIC BLOOD PRESSURE: 130 MMHG

## 2019-02-21 VITALS — HEART RATE: 96 BPM | SYSTOLIC BLOOD PRESSURE: 108 MMHG | TEMPERATURE: 98 F | DIASTOLIC BLOOD PRESSURE: 72 MMHG

## 2019-02-21 VITALS
BODY MASS INDEX: 29.45 KG/M2 | HEART RATE: 108 BPM | WEIGHT: 158 LBS | HEIGHT: 61.5 IN | SYSTOLIC BLOOD PRESSURE: 124 MMHG | DIASTOLIC BLOOD PRESSURE: 76 MMHG

## 2019-02-21 DIAGNOSIS — O09.899 SHORT INTERVAL BETWEEN PREGNANCIES AFFECTING PREGNANCY, ANTEPARTUM: ICD-10-CM

## 2019-02-21 DIAGNOSIS — O09.32 INSUFFICIENT PRENATAL CARE IN SECOND TRIMESTER: ICD-10-CM

## 2019-02-21 DIAGNOSIS — O36.5930 POOR FETAL GROWTH AFFECTING MANAGEMENT OF MOTHER IN THIRD TRIMESTER, SINGLE OR UNSPECIFIED FETUS: ICD-10-CM

## 2019-02-21 DIAGNOSIS — O36.5930 POOR FETAL GROWTH AFFECTING MANAGEMENT OF MOTHER IN THIRD TRIMESTER, SINGLE OR UNSPECIFIED FETUS: Primary | ICD-10-CM

## 2019-02-21 DIAGNOSIS — Z87.51 HISTORY OF PRETERM DELIVERY: ICD-10-CM

## 2019-02-21 DIAGNOSIS — Z34.83 PRENATAL CARE, SUBSEQUENT PREGNANCY, THIRD TRIMESTER: Primary | ICD-10-CM

## 2019-02-21 DIAGNOSIS — O47.03 PRETERM UTERINE CONTRACTIONS IN THIRD TRIMESTER, ANTEPARTUM: ICD-10-CM

## 2019-02-21 LAB
AMNI: NEGATIVE
APPEARANCE: CLEAR
MULTISTIX LOT#: NORMAL NUMERIC
PH, URINE: 6 (ref 4.5–8)
SPECIFIC GRAVITY: 1 (ref 1–1.03)
URINE-COLOR: YELLOW
UROBILINOGEN,SEMI-QN: 0.2 MG/DL (ref 0–1.9)

## 2019-02-21 PROCEDURE — 76805 OB US >/= 14 WKS SNGL FETUS: CPT | Performed by: OBSTETRICS & GYNECOLOGY

## 2019-02-21 PROCEDURE — 59426 ANTEPARTUM CARE ONLY: CPT | Performed by: ADVANCED PRACTICE MIDWIFE

## 2019-02-21 PROCEDURE — 59025 FETAL NON-STRESS TEST: CPT | Performed by: ADVANCED PRACTICE MIDWIFE

## 2019-02-21 PROCEDURE — 99213 OFFICE O/P EST LOW 20 MIN: CPT | Performed by: OBSTETRICS & GYNECOLOGY

## 2019-02-21 PROCEDURE — 76819 FETAL BIOPHYS PROFIL W/O NST: CPT | Performed by: OBSTETRICS & GYNECOLOGY

## 2019-02-21 PROCEDURE — 81002 URINALYSIS NONAUTO W/O SCOPE: CPT | Performed by: ADVANCED PRACTICE MIDWIFE

## 2019-02-21 NOTE — PROGRESS NOTES
Pt feeling much better since last seen in triage. Reports having one more elevated temp early yesterday morning which resolved with Tylenol and fluids. Has been hydrating and eating. Good FM.   Reports more watery discharge mixed with mucous today  Spec

## 2019-02-21 NOTE — NST
Nonstress Test   Patient: Ramón Cesar    Gestation: 38w5d    NST:       Variability: Moderate           Accelerations: Yes           Decelerations: None            Baseline: 140 BPM           Uterine Irritability: No

## 2019-02-21 NOTE — PROGRESS NOTES
Outpatient Maternal-Fetal Medicine Follow-Up    Dear Ms. Bernice Valenzuela    Thank you for requesting ultrasound evaluation and maternal fetal medicine consultation on your patient Gemini Conrad.   As you are aware she is a 19year old female  with a s consistent with dates at 15%  · Prior Late  Birth - secondary to PROM     RECOMMENDATIONS:  · Continue care with Ms. Cesar Night  · To L&D for evaluate as your request  · Weekly NST with RADHA    Thank you for allowing me to participate in the care of y

## 2019-02-23 ENCOUNTER — TELEPHONE (OUTPATIENT)
Dept: OBGYN CLINIC | Facility: CLINIC | Age: 19
End: 2019-02-23

## 2019-02-23 PROBLEM — N30.90 CYSTITIS: Status: RESOLVED | Noted: 2019-02-19 | Resolved: 2019-02-23

## 2019-02-24 ENCOUNTER — HOSPITAL ENCOUNTER (INPATIENT)
Facility: HOSPITAL | Age: 19
LOS: 2 days | Discharge: HOME | End: 2019-02-26
Attending: ADVANCED PRACTICE MIDWIFE | Admitting: OBSTETRICS & GYNECOLOGY
Payer: COMMERCIAL

## 2019-02-24 PROBLEM — Z37.9 NORMAL LABOR (HCC): Status: ACTIVE | Noted: 2019-02-24

## 2019-02-24 PROBLEM — Z37.9 NORMAL LABOR: Status: ACTIVE | Noted: 2019-02-24

## 2019-02-24 LAB
ANTIBODY SCREEN: NEGATIVE
DEPRECATED RDW RBC AUTO: 38 FL (ref 35.1–46.3)
ERYTHROCYTE [DISTWIDTH] IN BLOOD BY AUTOMATED COUNT: 11.8 % (ref 11–15)
HCT VFR BLD AUTO: 34.2 % (ref 35–48)
HGB BLD-MCNC: 12.3 G/DL (ref 12–16)
MCH RBC QN AUTO: 31.5 PG (ref 26–34)
MCHC RBC AUTO-ENTMCNC: 36 G/DL (ref 31–37)
MCV RBC AUTO: 87.5 FL (ref 80–100)
PLATELET # BLD AUTO: 276 10(3)UL (ref 150–450)
RBC # BLD AUTO: 3.91 X10(6)UL (ref 3.8–5.3)
RH BLOOD TYPE: POSITIVE
WBC # BLD AUTO: 8.2 X10(3) UL (ref 4–11)

## 2019-02-24 PROCEDURE — 59410 OBSTETRICAL CARE: CPT | Performed by: ADVANCED PRACTICE MIDWIFE

## 2019-02-24 RX ORDER — DOCUSATE SODIUM 100 MG/1
100 CAPSULE, LIQUID FILLED ORAL 2 TIMES DAILY
Status: DISCONTINUED | OUTPATIENT
Start: 2019-02-24 | End: 2019-02-26

## 2019-02-24 RX ORDER — IBUPROFEN 600 MG/1
600 TABLET ORAL ONCE AS NEEDED
Status: DISCONTINUED | OUTPATIENT
Start: 2019-02-24 | End: 2019-02-24 | Stop reason: HOSPADM

## 2019-02-24 RX ORDER — DIAPER,BRIEF,INFANT-TODD,DISP
1 EACH MISCELLANEOUS EVERY 6 HOURS PRN
Status: DISCONTINUED | OUTPATIENT
Start: 2019-02-24 | End: 2019-02-26

## 2019-02-24 RX ORDER — AMMONIA INHALANTS 0.04 G/.3ML
0.3 INHALANT RESPIRATORY (INHALATION) AS NEEDED
Status: DISCONTINUED | OUTPATIENT
Start: 2019-02-24 | End: 2019-02-24 | Stop reason: HOSPADM

## 2019-02-24 RX ORDER — IBUPROFEN 600 MG/1
600 TABLET ORAL EVERY 6 HOURS
Status: DISCONTINUED | OUTPATIENT
Start: 2019-02-24 | End: 2019-02-26

## 2019-02-24 RX ORDER — AMMONIA INHALANTS 0.04 G/.3ML
0.3 INHALANT RESPIRATORY (INHALATION) AS NEEDED
Status: DISCONTINUED | OUTPATIENT
Start: 2019-02-24 | End: 2019-02-26

## 2019-02-24 RX ORDER — IBUPROFEN 200 MG
400 TABLET ORAL EVERY 4 HOURS PRN
Status: DISCONTINUED | OUTPATIENT
Start: 2019-02-24 | End: 2019-02-26

## 2019-02-24 RX ORDER — TERBUTALINE SULFATE 1 MG/ML
0.25 INJECTION, SOLUTION SUBCUTANEOUS AS NEEDED
Status: DISCONTINUED | OUTPATIENT
Start: 2019-02-24 | End: 2019-02-24 | Stop reason: HOSPADM

## 2019-02-24 RX ORDER — LIDOCAINE HYDROCHLORIDE 10 MG/ML
30 INJECTION, SOLUTION EPIDURAL; INFILTRATION; INTRACAUDAL; PERINEURAL ONCE
Status: DISCONTINUED | OUTPATIENT
Start: 2019-02-24 | End: 2019-02-24 | Stop reason: HOSPADM

## 2019-02-24 RX ORDER — SODIUM CHLORIDE, SODIUM LACTATE, POTASSIUM CHLORIDE, CALCIUM CHLORIDE 600; 310; 30; 20 MG/100ML; MG/100ML; MG/100ML; MG/100ML
INJECTION, SOLUTION INTRAVENOUS CONTINUOUS
Status: DISCONTINUED | OUTPATIENT
Start: 2019-02-24 | End: 2019-02-24 | Stop reason: HOSPADM

## 2019-02-24 RX ORDER — IBUPROFEN 600 MG/1
600 TABLET ORAL EVERY 4 HOURS PRN
Status: DISCONTINUED | OUTPATIENT
Start: 2019-02-24 | End: 2019-02-26

## 2019-02-24 RX ORDER — ONDANSETRON 2 MG/ML
4 INJECTION INTRAMUSCULAR; INTRAVENOUS EVERY 6 HOURS PRN
Status: DISCONTINUED | OUTPATIENT
Start: 2019-02-24 | End: 2019-02-26

## 2019-02-24 RX ORDER — SODIUM CHLORIDE 0.9 % (FLUSH) 0.9 %
10 SYRINGE (ML) INJECTION AS NEEDED
Status: DISCONTINUED | OUTPATIENT
Start: 2019-02-24 | End: 2019-02-24 | Stop reason: HOSPADM

## 2019-02-24 RX ORDER — TRISODIUM CITRATE DIHYDRATE AND CITRIC ACID MONOHYDRATE 500; 334 MG/5ML; MG/5ML
30 SOLUTION ORAL AS NEEDED
Status: DISCONTINUED | OUTPATIENT
Start: 2019-02-24 | End: 2019-02-24 | Stop reason: HOSPADM

## 2019-02-24 RX ORDER — SODIUM CHLORIDE 0.9 % (FLUSH) 0.9 %
10 SYRINGE (ML) INJECTION AS NEEDED
Status: DISCONTINUED | OUTPATIENT
Start: 2019-02-24 | End: 2019-02-26

## 2019-02-24 RX ORDER — PRENATAL VIT,CAL 76/IRON/FOLIC 29 MG-1 MG
1 TABLET ORAL DAILY
Status: DISCONTINUED | OUTPATIENT
Start: 2019-02-24 | End: 2019-02-26

## 2019-02-24 RX ORDER — METHYLERGONOVINE MALEATE 0.2 MG/ML
INJECTION INTRAVENOUS
Status: DISCONTINUED
Start: 2019-02-24 | End: 2019-02-24 | Stop reason: WASHOUT

## 2019-02-24 RX ORDER — SIMETHICONE 80 MG
80 TABLET,CHEWABLE ORAL 3 TIMES DAILY PRN
Status: DISCONTINUED | OUTPATIENT
Start: 2019-02-24 | End: 2019-02-26

## 2019-02-24 RX ORDER — BISACODYL 10 MG
10 SUPPOSITORY, RECTAL RECTAL ONCE AS NEEDED
Status: DISCONTINUED | OUTPATIENT
Start: 2019-02-24 | End: 2019-02-26

## 2019-02-24 RX ORDER — IBUPROFEN 200 MG
200 TABLET ORAL EVERY 4 HOURS PRN
Status: DISCONTINUED | OUTPATIENT
Start: 2019-02-24 | End: 2019-02-26

## 2019-02-24 NOTE — L&D DELIVERY NOTE
[facility]    Vaginal Delivery Note    Ramón Cesar Patient Status:  Inpatient    10/9/2000 MRN [id number]   Location [address] Attending Anisa Delgado, [facility] Day # 0 PCP MD Israel Melton

## 2019-02-24 NOTE — PROGRESS NOTES
Pt is a 19year old female admitted to TR1/TR1-A. Patient presents with:  R/o Labor: regular uc's since 930     Pt is  39w1d intra-uterine pregnancy. History obtained, consents signed. Oriented to room, staff, and plan of care.

## 2019-02-24 NOTE — H&P
[facility] Patient Status:  Inpatient    10/9/2000 MRN [id number]   Location [address] [place] [address] Attending Manjula Castellanos, [facility]note 3[facility] Day # 0 PCP Laurel Nieto MD     Date of A Social History: Social History    Tobacco Use      Smoking status: Never Smoker      Smokeless tobacco: Never Used    Alcohol use: No      Alcohol/week: 0.0 oz       Allergies/Medications:    Allergies:   No Known Allergies  Medications:    Medications 39w1d with an estimated date of delivery of:  3/2/2019, by Last Menstrual Period    Active phase labor. Obstetrical history significant for   contractions, short interval between pregnancies, insufficiennt.   Pt received Betamethasone at [id number]

## 2019-02-25 LAB
HCT VFR BLD AUTO: 31.7 % (ref 35–48)
HGB BLD-MCNC: 10.6 G/DL (ref 12–16)

## 2019-02-25 NOTE — LACTATION NOTE
This note was copied from a baby's chart.   LACTATION NOTE - INFANT    Evaluation Type  Evaluation Type: Inpatient    Problems & Assessment  Problems Diagnosed or Identified: Sleepy  Infant Assessment: Skin color: pink or appropriate for ethnicity  Muscle t

## 2019-02-25 NOTE — PROGRESS NOTES
[facility] - [facility]    OB/GYNE Progress Note      Love Gong Patient Status:  Inpatient    10/9/2000 MRN [id number]   Location [facility] 3SE Attending Shaw Mendoza, [address] Day # 1 PCP Nagi Haney MD        Assessment/Plan Value Date    COLORUR Yellow 02/19/2019    CLARITY Clear 02/19/2019    SPECGRAVITY 1.005 02/21/2019    PROUR Negative 02/19/2019    GLUUR neg 02/21/2019    KETUR 20  (A) 02/19/2019    BILUR Negative 02/19/2019    BLOODURINE Negative 02/19/2019    NITRITE n

## 2019-02-26 VITALS
HEART RATE: 91 BPM | RESPIRATION RATE: 16 BRPM | TEMPERATURE: 98 F | DIASTOLIC BLOOD PRESSURE: 61 MMHG | SYSTOLIC BLOOD PRESSURE: 115 MMHG

## 2019-02-26 RX ORDER — IBUPROFEN 600 MG/1
600 TABLET ORAL EVERY 6 HOURS PRN
Qty: 30 TABLET | Refills: 1 | Status: SHIPPED | OUTPATIENT
Start: 2019-02-26 | End: 2019-12-04

## 2019-02-26 RX ORDER — FERROUS SULFATE 325(65) MG
325 TABLET ORAL
Qty: 60 TABLET | Refills: 1 | Status: SHIPPED | OUTPATIENT
Start: 2019-02-26 | End: 2019-11-18 | Stop reason: ALTCHOICE

## 2019-02-26 RX ORDER — PSEUDOEPHEDRINE HCL 30 MG
100 TABLET ORAL 2 TIMES DAILY PRN
Qty: 14 CAPSULE | Refills: 0 | Status: SHIPPED | OUTPATIENT
Start: 2019-02-26 | End: 2019-11-18 | Stop reason: ALTCHOICE

## 2019-02-26 NOTE — PROGRESS NOTES
[facility] - [facility]    OB/GYNE Progress Note      Valaria Ill Patient Status:  Inpatient    10/9/2000 MRN [id number]   Location [facility] 3SE Attending Shona Bhat, [address] Day # 2 PCP Kelly Vasquez MD        Assessment/Plan 02/25/2019    .0 02/24/2019       Lab Results   Component Value Date    COLORUR Yellow 02/19/2019    CLARITY Clear 02/19/2019    SPECGRAVITY 1.005 02/21/2019    PROUR Negative 02/19/2019    GLUUR neg 02/21/2019    KETUR 20  (A) 02/19/2019    BILUR N

## 2019-02-26 NOTE — LACTATION NOTE
LACTATION NOTE - MOTHER      Evaluation Type: Inpatient    Problems identified  Problems identified: Knowledge deficit; Nipple pain    Breastfeeding goal  Breastfeeding goal: To maintain breast milk feeding per patient goal    Maternal Assessment  Bilateral supplement as infant improves at breastfeeding. Encouraged cue based feedings when providing expressed breastmilk per alternative means or via a bottle.  Post-discharge breastfeeding resources reviewed and encouraged to call for assistance with breastfeedin

## 2019-02-26 NOTE — PLAN OF CARE
ANXIETY    • Will report anxiety at manageable levels Progressing        PAIN - ADULT    • Verbalizes/displays adequate comfort level or patient's stated pain goal Progressing        Patient Centered Care    • Patient preferences are identified and Wes Zimmerman

## 2019-02-26 NOTE — LACTATION NOTE
This note was copied from a baby's chart.   LACTATION NOTE - INFANT    Evaluation Type  Evaluation Type: Inpatient    Problems & Assessment  Problems Diagnosed or Identified: Jaundice;Sleepy  Infant Assessment: Minimal hunger cues present;Skin color: jaundi

## 2019-02-26 NOTE — DISCHARGE SUMMARY
[facility]    Discharge Summary    Alan Marion Patient Status:  Inpatient    10/9/2000 MRN [id number]   Location [facility] 3SE Attending Dede Valdivia, [address] Day # 2       Delivering OB Clinician:  Asya Adams

## 2019-03-11 ENCOUNTER — POSTPARTUM (OUTPATIENT)
Dept: OBGYN CLINIC | Facility: CLINIC | Age: 19
End: 2019-03-11
Payer: COMMERCIAL

## 2019-03-11 VITALS
BODY MASS INDEX: 26.33 KG/M2 | WEIGHT: 141.25 LBS | SYSTOLIC BLOOD PRESSURE: 118 MMHG | HEART RATE: 78 BPM | DIASTOLIC BLOOD PRESSURE: 77 MMHG | HEIGHT: 61.5 IN

## 2019-03-11 NOTE — PROGRESS NOTES
Patient here for postpartum check-up. Vaginal delivery @ 2 weeks ago with REGAN MES. Breastfeeding exclusively, on demand. Baby with adequate weight gain. Denies fevers, chills, body aches and flu-like symptoms.   Denies abdominal pain, no pelvic pain, repo

## 2019-04-08 ENCOUNTER — POSTPARTUM (OUTPATIENT)
Dept: OBGYN CLINIC | Facility: CLINIC | Age: 19
End: 2019-04-08
Payer: COMMERCIAL

## 2019-04-08 VITALS — SYSTOLIC BLOOD PRESSURE: 104 MMHG | DIASTOLIC BLOOD PRESSURE: 68 MMHG | BODY MASS INDEX: 25 KG/M2 | WEIGHT: 137 LBS

## 2019-04-08 DIAGNOSIS — Z30.013 INITIATION OF DEPO PROVERA: ICD-10-CM

## 2019-04-08 PROCEDURE — 81025 URINE PREGNANCY TEST: CPT | Performed by: ADVANCED PRACTICE MIDWIFE

## 2019-04-08 PROCEDURE — 96372 THER/PROPH/DIAG INJ SC/IM: CPT | Performed by: ADVANCED PRACTICE MIDWIFE

## 2019-04-08 RX ORDER — MEDROXYPROGESTERONE ACETATE 150 MG/ML
150 INJECTION, SUSPENSION INTRAMUSCULAR
Status: SHIPPED | OUTPATIENT
Start: 2019-04-08

## 2019-04-08 RX ADMIN — MEDROXYPROGESTERONE ACETATE 150 MG: 150 INJECTION, SUSPENSION INTRAMUSCULAR at 12:43:00

## 2019-04-08 NOTE — PROGRESS NOTES
Patient here for postpartum check-up. Vaginal delivery @ 6 weeks ago with REGAN MES. Breastfeeding exclusively, on demand. Baby with adequate weight gain. Denies fevers, chills, body aches and flu-like symptoms.   Denies abdominal pain, no pelvic pain, repo

## 2019-06-18 ENCOUNTER — TELEPHONE (OUTPATIENT)
Dept: OBGYN CLINIC | Facility: CLINIC | Age: 19
End: 2019-06-18

## 2019-06-29 ENCOUNTER — NURSE ONLY (OUTPATIENT)
Dept: OBGYN CLINIC | Facility: CLINIC | Age: 19
End: 2019-06-29
Payer: COMMERCIAL

## 2019-06-29 VITALS — HEART RATE: 97 BPM | SYSTOLIC BLOOD PRESSURE: 109 MMHG | DIASTOLIC BLOOD PRESSURE: 73 MMHG

## 2019-06-29 DIAGNOSIS — Z30.09 FAMILY PLANNING: Primary | ICD-10-CM

## 2019-06-29 PROCEDURE — 96372 THER/PROPH/DIAG INJ SC/IM: CPT | Performed by: ADVANCED PRACTICE MIDWIFE

## 2019-06-29 PROCEDURE — 81025 URINE PREGNANCY TEST: CPT | Performed by: ADVANCED PRACTICE MIDWIFE

## 2019-06-29 RX ADMIN — MEDROXYPROGESTERONE ACETATE 150 MG: 150 INJECTION, SUSPENSION INTRAMUSCULAR at 09:51:00

## 2019-06-29 NOTE — PROGRESS NOTES
Pt is here Depo Provera 150 mg injection. Negative UCG in office. Injection given left upper outer gluteus, pt tolerated well. Pt advised to return to clinic for next Depo between 9/14/19-9/28/19. Pt agreed and voiced understanding.

## 2019-09-06 ENCOUNTER — TELEPHONE (OUTPATIENT)
Dept: OBGYN CLINIC | Facility: CLINIC | Age: 19
End: 2019-09-06

## 2019-09-14 ENCOUNTER — NURSE ONLY (OUTPATIENT)
Dept: OBGYN CLINIC | Facility: CLINIC | Age: 19
End: 2019-09-14
Payer: COMMERCIAL

## 2019-09-14 VITALS
DIASTOLIC BLOOD PRESSURE: 71 MMHG | WEIGHT: 132.81 LBS | SYSTOLIC BLOOD PRESSURE: 110 MMHG | BODY MASS INDEX: 25 KG/M2 | HEART RATE: 70 BPM

## 2019-09-14 DIAGNOSIS — Z30.42 ENCOUNTER FOR SURVEILLANCE OF INJECTABLE CONTRACEPTIVE: Primary | ICD-10-CM

## 2019-09-14 LAB
CONTROL LINE PRESENT WITH A CLEAR BACKGROUND (YES/NO): YES YES/NO
KIT LOT #: NORMAL NUMERIC
PREGNANCY TEST, URINE: NEGATIVE

## 2019-09-14 PROCEDURE — 96372 THER/PROPH/DIAG INJ SC/IM: CPT | Performed by: ADVANCED PRACTICE MIDWIFE

## 2019-09-14 PROCEDURE — 81025 URINE PREGNANCY TEST: CPT | Performed by: ADVANCED PRACTICE MIDWIFE

## 2019-09-14 RX ORDER — MEDROXYPROGESTERONE ACETATE 150 MG/ML
150 INJECTION, SUSPENSION INTRAMUSCULAR
Status: COMPLETED | OUTPATIENT
Start: 2019-09-14 | End: 2020-08-22

## 2019-09-14 RX ADMIN — MEDROXYPROGESTERONE ACETATE 150 MG: 150 INJECTION, SUSPENSION INTRAMUSCULAR at 09:05:00

## 2019-09-14 NOTE — PROGRESS NOTES
Depo given rvg as ordered & tolerated well. Pt has no lmp, currently breastfdg. Happy w/ depo, denies any side effects. Return handout given to pt.  Pt to return 11/30-12/14/19 for next dose

## 2019-11-12 ENCOUNTER — OFFICE VISIT (OUTPATIENT)
Dept: INTERNAL MEDICINE CLINIC | Facility: CLINIC | Age: 19
End: 2019-11-12
Payer: COMMERCIAL

## 2019-11-12 VITALS
SYSTOLIC BLOOD PRESSURE: 124 MMHG | HEIGHT: 62 IN | DIASTOLIC BLOOD PRESSURE: 79 MMHG | TEMPERATURE: 98 F | HEART RATE: 92 BPM | BODY MASS INDEX: 24.48 KG/M2 | WEIGHT: 133 LBS

## 2019-11-12 DIAGNOSIS — S99.929A INJURY OF NAIL BED OF TOE: Primary | ICD-10-CM

## 2019-11-12 DIAGNOSIS — B35.1 ONYCHOMYCOSIS: ICD-10-CM

## 2019-11-12 DIAGNOSIS — J06.9 URI WITH COUGH AND CONGESTION: ICD-10-CM

## 2019-11-12 PROCEDURE — 99202 OFFICE O/P NEW SF 15 MIN: CPT | Performed by: INTERNAL MEDICINE

## 2019-11-12 RX ORDER — FLUTICASONE PROPIONATE 50 MCG
SPRAY, SUSPENSION (ML) NASAL
Qty: 1 BOTTLE | Refills: 3 | Status: SHIPPED | OUTPATIENT
Start: 2019-11-12 | End: 2019-12-04

## 2019-11-12 NOTE — PROGRESS NOTES
History of Present Illness   Patient ID: Leia Vargas is a 22year old female. Chief Complaint: Toenail (c/o right 2nd toenail, first layer and side of nail came off. ) and Cold (cough. )      Toe Injury    Incident onset: Sunday.  The incident occur Constitutional: She is oriented to person, place, and time. She appears well-developed and well-nourished. HENT:   Head: Normocephalic and atraumatic.    Right Ear: Hearing, tympanic membrane, external ear and ear canal normal.   Left Ear: Hearing, tympan The ASCVD Risk score (Rachana Villela, et al., 2013) failed to calculate for the following reasons:     The 2013 ASCVD risk score is only valid for ages 36 to 78      Medical History    Reviewed allergies:  No Known Allergies     Reviewed:  Patient Active Probl •  docusate sodium 100 MG Oral Cap, Take 100 mg by mouth 2 (two) times daily as needed for constipation. , Disp: 14 capsule, Rfl: 0  •  Ferrous Sulfate 325 (65 Fe) MG Oral Tab, Take 1 tablet (325 mg total) by mouth daily with breakfast., Disp: 60 tablet, Rf Call office with any questions. Seek emergency care if necessary.          ----------------------------------------- END NOTE-----------------------------------------     Patient Instructions     Nail Fungal Infection  A nail fungal infection changes the wa · Use footwear when in damp public places like swimming pools, gyms, and shower rooms. This will help you avoid the fungus that grows there. · Don't share nail clippers or scissors with others.   Follow-up care  Follow up with your healthcare provider, or · Keep the injured part raised to reduce pain and swelling. This is important, especially in the first 48 hours. · Apply an ice pack for up to 20 minutes. Do this every 3 to 6 hours during the first 24 to 48 hours.  Keep using ice packs to ease pain and sw · If you were given antibiotics, take them until they are done. It is important to finish the antibiotics even if the wound looks better. This is to make sure the infection has cleared.     Follow-up care  Follow up with your healthcare provider, or as advi

## 2019-11-12 NOTE — PATIENT INSTRUCTIONS
Nail Fungal Infection  A nail fungal infection changes the way fingernails and toenails look. They may thicken, discolor, change shape, or split. This condition is hard to treat because nails grow slowly and have limited blood supply.  The infection often Call your healthcare provider right away if any of these occur:  · Skin by the nail becomes red, swollen, painful, or drains pus (a creamy yellow or white liquid)  · Side effects from oral anti-fungal medicines  Date Last Reviewed: 8/1/2016 © 2000-2019 Th · Apply an ice pack for up to 20 minutes. Do this every 3 to 6 hours during the first 24 to 48 hours. Keep using ice packs to ease pain and swelling as needed. To make an ice pack, put ice cubes in a plastic bag that seals at the top.  Wrap the bag in a khai · If you were given antibiotics, take them until they are done. It is important to finish the antibiotics even if the wound looks better. This is to make sure the infection has cleared.     Follow-up care  Follow up with your healthcare provider, or as advi

## 2019-11-18 ENCOUNTER — OFFICE VISIT (OUTPATIENT)
Dept: PODIATRY CLINIC | Facility: CLINIC | Age: 19
End: 2019-11-18
Payer: COMMERCIAL

## 2019-11-18 DIAGNOSIS — L60.3 NAIL DYSTROPHY: Primary | ICD-10-CM

## 2019-11-18 PROCEDURE — 99242 OFF/OP CONSLTJ NEW/EST SF 20: CPT | Performed by: PODIATRIST

## 2019-11-18 NOTE — PROGRESS NOTES
HPI:    Patient ID: Leia Vargas is a 22year old female. Pleasant 18-year-old female presents as a new patient to me on consult from [address]. Patient presents with concerns associated with the second right toenail.   She had an injury to this na for them. She is well-informed and indicates an understanding and will follow-up as appropriate       ASSESSMENT/PLAN:   Nail dystrophy  (primary encounter diagnosis)    No orders of the defined types were placed in this encounter.       Meds This Visit:

## 2019-12-04 ENCOUNTER — OFFICE VISIT (OUTPATIENT)
Dept: INTERNAL MEDICINE CLINIC | Facility: CLINIC | Age: 19
End: 2019-12-04
Payer: COMMERCIAL

## 2019-12-04 VITALS
BODY MASS INDEX: 24.84 KG/M2 | WEIGHT: 135 LBS | SYSTOLIC BLOOD PRESSURE: 113 MMHG | DIASTOLIC BLOOD PRESSURE: 72 MMHG | TEMPERATURE: 98 F | HEIGHT: 62 IN | RESPIRATION RATE: 22 BRPM | HEART RATE: 91 BPM

## 2019-12-04 DIAGNOSIS — Z00.00 PHYSICAL EXAM, ANNUAL: Primary | ICD-10-CM

## 2019-12-04 PROCEDURE — 99395 PREV VISIT EST AGE 18-39: CPT | Performed by: INTERNAL MEDICINE

## 2019-12-05 PROBLEM — O09.32 INSUFFICIENT PRENATAL CARE IN SECOND TRIMESTER (HCC): Status: RESOLVED | Noted: 2018-10-24 | Resolved: 2019-12-05

## 2019-12-05 PROBLEM — O09.899 SHORT INTERVAL BETWEEN PREGNANCIES AFFECTING PREGNANCY, ANTEPARTUM: Status: RESOLVED | Noted: 2018-10-24 | Resolved: 2019-12-05

## 2019-12-05 PROBLEM — Z37.9 NORMAL LABOR: Status: RESOLVED | Noted: 2019-02-24 | Resolved: 2019-12-05

## 2019-12-05 PROBLEM — Z34.90 PREGNANCY: Status: RESOLVED | Noted: 2019-01-28 | Resolved: 2019-12-05

## 2019-12-05 PROBLEM — O09.32 INSUFFICIENT PRENATAL CARE IN SECOND TRIMESTER: Status: RESOLVED | Noted: 2018-10-24 | Resolved: 2019-12-05

## 2019-12-05 PROBLEM — Z34.90 PREGNANCY (HCC): Status: RESOLVED | Noted: 2019-01-28 | Resolved: 2019-12-05

## 2019-12-05 PROBLEM — Z87.51 HISTORY OF PRETERM DELIVERY: Status: RESOLVED | Noted: 2018-10-15 | Resolved: 2019-12-05

## 2019-12-05 PROBLEM — R50.9 FEBRILE ILLNESS: Status: RESOLVED | Noted: 2019-02-19 | Resolved: 2019-12-05

## 2019-12-05 PROBLEM — O47.03 PRETERM UTERINE CONTRACTIONS IN THIRD TRIMESTER, ANTEPARTUM: Status: RESOLVED | Noted: 2019-01-28 | Resolved: 2019-12-05

## 2019-12-05 PROBLEM — Z37.9 NORMAL LABOR (HCC): Status: RESOLVED | Noted: 2019-02-24 | Resolved: 2019-12-05

## 2019-12-05 PROBLEM — O47.03 PRETERM UTERINE CONTRACTIONS IN THIRD TRIMESTER, ANTEPARTUM (HCC): Status: RESOLVED | Noted: 2019-01-28 | Resolved: 2019-12-05

## 2019-12-05 PROBLEM — O09.899 SHORT INTERVAL BETWEEN PREGNANCIES AFFECTING PREGNANCY, ANTEPARTUM (HCC): Status: RESOLVED | Noted: 2018-10-24 | Resolved: 2019-12-05

## 2019-12-05 PROBLEM — E86.0 DEHYDRATION: Status: RESOLVED | Noted: 2019-02-19 | Resolved: 2019-12-05

## 2019-12-06 NOTE — PROGRESS NOTES
HPI:    Patient ID: Srinivasa Gaston is a 22year old female.   Presents for physical exam    HPI  Patient reports that she has been doing well, she has no concerns, she had a baby 9 months ago still breast-feeding, she sees gynecologist periodically has rebound and no guarding. Lymphadenopathy:     She has no cervical adenopathy. Neurological: She is alert and oriented to person, place, and time. No cranial nerve deficit or motor deficit. Gait normal.   Psychiatric: She has a normal mood and affect.  H

## 2019-12-17 ENCOUNTER — TELEPHONE (OUTPATIENT)
Dept: OBGYN CLINIC | Facility: CLINIC | Age: 19
End: 2019-12-17

## 2019-12-17 NOTE — TELEPHONE ENCOUNTER
Pt notified her depo was due 11/30-12/14. appt scheduled for tomorrow 12/18.  Pt verbalized an understanding & agrees w/ plan

## 2019-12-18 ENCOUNTER — NURSE ONLY (OUTPATIENT)
Dept: OBGYN CLINIC | Facility: CLINIC | Age: 19
End: 2019-12-18
Payer: COMMERCIAL

## 2019-12-18 VITALS — HEART RATE: 98 BPM | SYSTOLIC BLOOD PRESSURE: 122 MMHG | DIASTOLIC BLOOD PRESSURE: 80 MMHG

## 2019-12-18 DIAGNOSIS — Z30.09 FAMILY PLANNING: Primary | ICD-10-CM

## 2019-12-18 PROCEDURE — 81025 URINE PREGNANCY TEST: CPT | Performed by: ADVANCED PRACTICE MIDWIFE

## 2019-12-18 PROCEDURE — 96372 THER/PROPH/DIAG INJ SC/IM: CPT | Performed by: ADVANCED PRACTICE MIDWIFE

## 2019-12-18 RX ADMIN — MEDROXYPROGESTERONE ACETATE 150 MG: 150 INJECTION, SUSPENSION INTRAMUSCULAR at 17:29:00

## 2019-12-18 NOTE — PROGRESS NOTES
Pt is here for Depo Provera 150 mg injection. Negative urine pregnancy test in office. Injection given in right upper outer gluteus, pt tolerated well. Pt advised to return to clinic for next Depo between 3/5/20-3/19/20. Pt agreed and voiced understanding.

## 2019-12-26 ENCOUNTER — OFFICE VISIT (OUTPATIENT)
Dept: INTERNAL MEDICINE CLINIC | Facility: CLINIC | Age: 19
End: 2019-12-26
Payer: COMMERCIAL

## 2019-12-26 VITALS
BODY MASS INDEX: 24.48 KG/M2 | HEART RATE: 125 BPM | SYSTOLIC BLOOD PRESSURE: 108 MMHG | WEIGHT: 133 LBS | DIASTOLIC BLOOD PRESSURE: 69 MMHG | HEIGHT: 62 IN | TEMPERATURE: 98 F

## 2019-12-26 DIAGNOSIS — H65.191 OTHER NON-RECURRENT ACUTE NONSUPPURATIVE OTITIS MEDIA OF RIGHT EAR: Primary | ICD-10-CM

## 2019-12-26 PROCEDURE — 99213 OFFICE O/P EST LOW 20 MIN: CPT | Performed by: PHYSICIAN ASSISTANT

## 2019-12-26 RX ORDER — AMOXICILLIN AND CLAVULANATE POTASSIUM 875; 125 MG/1; MG/1
1 TABLET, FILM COATED ORAL 2 TIMES DAILY
Qty: 14 TABLET | Refills: 0 | Status: SHIPPED | OUTPATIENT
Start: 2019-12-26 | End: 2020-01-02

## 2019-12-26 NOTE — PROGRESS NOTES
HPI:    Patient ID: Wing Stewart is a 22year old female. HPI   Pt presents complaining of sore throat and ear pain mostly on the right side. Denies any fevers. Son was diagnosed with strep recently.  Pt is breastfeeding      Review of Systems   Co Constitutional: She is oriented to person, place, and time and thin. She appears well-developed and well-nourished. HENT:   Head: Normocephalic and atraumatic. Right Ear: Tympanic membrane is erythematous.    Left Ear: Tympanic membrane normal.   Nose

## 2020-03-09 ENCOUNTER — NURSE ONLY (OUTPATIENT)
Dept: OBGYN CLINIC | Facility: CLINIC | Age: 20
End: 2020-03-09
Payer: COMMERCIAL

## 2020-03-09 VITALS
WEIGHT: 132 LBS | SYSTOLIC BLOOD PRESSURE: 121 MMHG | BODY MASS INDEX: 24 KG/M2 | DIASTOLIC BLOOD PRESSURE: 86 MMHG | HEART RATE: 94 BPM

## 2020-03-09 DIAGNOSIS — Z30.42 DEPO-PROVERA CONTRACEPTIVE STATUS: Primary | ICD-10-CM

## 2020-03-09 LAB
CONTROL LINE PRESENT WITH A CLEAR BACKGROUND (YES/NO): YES YES/NO
PREGNANCY TEST, URINE: NEGATIVE

## 2020-03-09 PROCEDURE — 81025 URINE PREGNANCY TEST: CPT | Performed by: ADVANCED PRACTICE MIDWIFE

## 2020-03-09 PROCEDURE — 96372 THER/PROPH/DIAG INJ SC/IM: CPT | Performed by: ADVANCED PRACTICE MIDWIFE

## 2020-03-09 RX ADMIN — MEDROXYPROGESTERONE ACETATE 150 MG: 150 INJECTION, SUSPENSION INTRAMUSCULAR at 14:30:00

## 2020-03-09 NOTE — PROGRESS NOTES
Depo given as ordered. Pt tolerated it well. Instructed to return on time for next dose due 5/25-6/8/20 & handout given.  Pt verbalized an understanding & agrees w/ plan

## 2020-05-21 ENCOUNTER — TELEPHONE (OUTPATIENT)
Dept: OBGYN CLINIC | Facility: CLINIC | Age: 20
End: 2020-05-21

## 2020-05-21 NOTE — TELEPHONE ENCOUNTER
Pt was advised she is due for Depo between 5/25/20-6/8/20. Pt was due for Annual in 4/20. Appt scheduled for annual and Depo. Pt agrees with plan.

## 2020-06-02 ENCOUNTER — OFFICE VISIT (OUTPATIENT)
Dept: OBGYN CLINIC | Facility: CLINIC | Age: 20
End: 2020-06-02
Payer: COMMERCIAL

## 2020-06-02 VITALS
BODY MASS INDEX: 25.37 KG/M2 | DIASTOLIC BLOOD PRESSURE: 72 MMHG | SYSTOLIC BLOOD PRESSURE: 104 MMHG | HEART RATE: 88 BPM | WEIGHT: 134.38 LBS | HEIGHT: 61 IN

## 2020-06-02 DIAGNOSIS — Z01.419 ENCOUNTER FOR GYNECOLOGICAL EXAMINATION WITHOUT ABNORMAL FINDING: Primary | ICD-10-CM

## 2020-06-02 DIAGNOSIS — Z30.42 ON DEPO-PROVERA FOR CONTRACEPTION: ICD-10-CM

## 2020-06-02 DIAGNOSIS — Z11.3 SCREEN FOR STD (SEXUALLY TRANSMITTED DISEASE): ICD-10-CM

## 2020-06-02 DIAGNOSIS — Z12.4 SCREENING FOR MALIGNANT NEOPLASM OF CERVIX: ICD-10-CM

## 2020-06-02 PROCEDURE — 81025 URINE PREGNANCY TEST: CPT | Performed by: ADVANCED PRACTICE MIDWIFE

## 2020-06-02 PROCEDURE — 99395 PREV VISIT EST AGE 18-39: CPT | Performed by: ADVANCED PRACTICE MIDWIFE

## 2020-06-02 PROCEDURE — 96372 THER/PROPH/DIAG INJ SC/IM: CPT | Performed by: ADVANCED PRACTICE MIDWIFE

## 2020-06-02 RX ADMIN — MEDROXYPROGESTERONE ACETATE 150 MG: 150 INJECTION, SUSPENSION INTRAMUSCULAR at 09:45:00

## 2020-06-02 NOTE — PROGRESS NOTES
HPI:   Alan Marion is a 22year old female  who presents for annual and first PAP. Shanelletim Dmitry reports no concerns. Is busy at home with 1 and 3 yo. Still breastfeeding. Monogamous relationship. Denies DV.   Happy with Depo      Medications ( constipation or diarrhea  : denies dysuria, pelvic pain, vaginal discharge or discomfort; ,amenorrheic, denies abnormal bleeding; denies dyspareunia  MUSCULOSKELETAL: denies back or joint pain  NEURO: denies headaches or dizziness  PSYCHE: denies depress YUE Tolbert, APRN, CNM

## 2020-06-03 NOTE — PATIENT INSTRUCTIONS
Jah Cervantes-  Please remember to start a Calcium supplement 1000-1200mg daily, and Vitamin D3 4634-4159 IU daily. Calcium Supplements  Calcium is a mineral that helps make strong bones and teeth. Most of the calcium in your body is in your bones.  It t · Be aware that taking calcium interferes with the absorption of some bacteria-fighting medicines (antibiotics). Talk to your provider or pharmacist.  · Eat a balanced diet to get all the nutrients your body needs.   Sources of calcium  · Milk, yogurt, and

## 2020-06-04 ENCOUNTER — LAB ENCOUNTER (OUTPATIENT)
Dept: LAB | Facility: HOSPITAL | Age: 20
End: 2020-06-04
Attending: ADVANCED PRACTICE MIDWIFE
Payer: COMMERCIAL

## 2020-06-04 ENCOUNTER — TELEPHONE (OUTPATIENT)
Dept: OBGYN CLINIC | Facility: CLINIC | Age: 20
End: 2020-06-04

## 2020-06-04 DIAGNOSIS — A74.9 CHLAMYDIA: Primary | ICD-10-CM

## 2020-06-04 DIAGNOSIS — Z00.00 PHYSICAL EXAM, ANNUAL: ICD-10-CM

## 2020-06-04 DIAGNOSIS — A74.9 CHLAMYDIA: ICD-10-CM

## 2020-06-04 PROCEDURE — 80053 COMPREHEN METABOLIC PANEL: CPT

## 2020-06-04 PROCEDURE — 87591 N.GONORRHOEAE DNA AMP PROB: CPT

## 2020-06-04 PROCEDURE — 85025 COMPLETE CBC W/AUTO DIFF WBC: CPT

## 2020-06-04 PROCEDURE — 36415 COLL VENOUS BLD VENIPUNCTURE: CPT

## 2020-06-04 PROCEDURE — 84443 ASSAY THYROID STIM HORMONE: CPT

## 2020-06-04 PROCEDURE — 87491 CHLMYD TRACH DNA AMP PROBE: CPT

## 2020-06-04 RX ORDER — AZITHROMYCIN 500 MG/1
TABLET, FILM COATED ORAL
Qty: 2 TABLET | Refills: 0 | Status: SHIPPED | OUTPATIENT
Start: 2020-06-04 | End: 2020-06-08

## 2020-06-04 NOTE — TELEPHONE ENCOUNTER
Notified pt of cx positive for chlamydia. Discussed treatment & ept, abstaining from intercourse for 7 days after treatment & use of condoms for all sexual contact. Offered pt blood STI testing & pt agreed.  Instructed pt to return for MARKIE in 3 mos, when ne

## 2020-06-04 NOTE — TELEPHONE ENCOUNTER
PEYTON Jimenez sent to [facility]             See prev message. Savanah Huynh- please advise patient to come to lab for additional STI screening:   HIV, RPR, HCV and out in orders if she consents.  Thx!       Please notify pt and

## 2020-06-05 ENCOUNTER — TELEPHONE (OUTPATIENT)
Dept: OBGYN CLINIC | Facility: CLINIC | Age: 20
End: 2020-06-05

## 2020-06-05 DIAGNOSIS — Z11.3 SCREENING FOR STD (SEXUALLY TRANSMITTED DISEASE): Primary | ICD-10-CM

## 2020-06-05 NOTE — TELEPHONE ENCOUNTER
Pt was calling to see if the STD testing was done yesterday. Pt was advised it was not and she will need to return to the lab for the blood draw. I checked with the lab and they are not able to add the testing on. STD testing ordered.   Pt agrees with pl

## 2020-06-08 ENCOUNTER — TELEPHONE (OUTPATIENT)
Dept: OBGYN CLINIC | Facility: CLINIC | Age: 20
End: 2020-06-08

## 2020-06-08 NOTE — TELEPHONE ENCOUNTER
LMTCB    2   Reva Garcia  Female, 22year old  10/9/2000, [id number]  Phone:   [phone]   Received:  Today   Message Contents   PEYTON Jimenez sent to Precious Vanegas RN   Caller: Unspecified (Today,  1:11 PM)             Yes he should still

## 2020-06-08 NOTE — TELEPHONE ENCOUNTER
Left message on VM - wrong test was ordered last week. Lab has been notified to cancel GCCT that was added on 6/4/20, as this had already been done 6/2/20. Pt was supposed to have HIV, RPR, HCV drawn. Orders are in.

## 2020-06-08 NOTE — TELEPHONE ENCOUNTER
Pt was advised of BR's recs and states her partner will take the Rx as advised. Pt agrees with plan.

## 2020-06-08 NOTE — TELEPHONE ENCOUNTER
Pt was advised there was an error with her lab work and she will need to return to the lab for STD testing. Pt was advised the wrong test was run and pt will be credited for the test.  Pt will return to the lab.   Pt states her partner was given Rx for Chl

## 2020-07-15 ENCOUNTER — TELEPHONE (OUTPATIENT)
Dept: OBGYN CLINIC | Facility: CLINIC | Age: 20
End: 2020-07-15

## 2020-07-15 DIAGNOSIS — A74.9 CHLAMYDIA: Primary | ICD-10-CM

## 2020-07-15 NOTE — TELEPHONE ENCOUNTER
Reminded pt to complete STD testing. MARKIE for gc/ct was added.  Pt verbalized an understanding & agrees w/ plan

## 2020-08-07 ENCOUNTER — LAB ENCOUNTER (OUTPATIENT)
Dept: LAB | Facility: HOSPITAL | Age: 20
End: 2020-08-07
Attending: ADVANCED PRACTICE MIDWIFE
Payer: COMMERCIAL

## 2020-08-07 DIAGNOSIS — Z11.3 SCREENING FOR STD (SEXUALLY TRANSMITTED DISEASE): ICD-10-CM

## 2020-08-07 LAB — HCV AB SERPL QL IA: NONREACTIVE

## 2020-08-07 PROCEDURE — 86803 HEPATITIS C AB TEST: CPT

## 2020-08-07 PROCEDURE — 87389 HIV-1 AG W/HIV-1&-2 AB AG IA: CPT

## 2020-08-07 PROCEDURE — 86780 TREPONEMA PALLIDUM: CPT

## 2020-08-07 PROCEDURE — 36415 COLL VENOUS BLD VENIPUNCTURE: CPT

## 2020-08-10 LAB — T PALLIDUM AB SER QL: NEGATIVE

## 2020-08-12 ENCOUNTER — TELEPHONE (OUTPATIENT)
Dept: OBGYN CLINIC | Facility: CLINIC | Age: 20
End: 2020-08-12

## 2020-08-12 NOTE — TELEPHONE ENCOUNTER
Spoke to patient, informed patient her window for depo is 8/18-9/1 appt schedule. Patient also need a MARKIE for Chlamydia. Please do off urine when patient comes in for depo.  Patient agrees with plan patient verbally understands

## 2020-08-22 ENCOUNTER — NURSE ONLY (OUTPATIENT)
Dept: OBGYN CLINIC | Facility: CLINIC | Age: 20
End: 2020-08-22
Payer: COMMERCIAL

## 2020-08-22 ENCOUNTER — TELEPHONE (OUTPATIENT)
Dept: OBGYN CLINIC | Facility: CLINIC | Age: 20
End: 2020-08-22

## 2020-08-22 VITALS
BODY MASS INDEX: 26 KG/M2 | WEIGHT: 135 LBS | DIASTOLIC BLOOD PRESSURE: 69 MMHG | SYSTOLIC BLOOD PRESSURE: 107 MMHG | HEART RATE: 81 BPM

## 2020-08-22 DIAGNOSIS — Z30.42 ENCOUNTER FOR MANAGEMENT AND INJECTION OF DEPO-PROVERA: Primary | ICD-10-CM

## 2020-08-22 DIAGNOSIS — Z11.3 SCREEN FOR STD (SEXUALLY TRANSMITTED DISEASE): ICD-10-CM

## 2020-08-22 LAB
CONTROL LINE PRESENT WITH A CLEAR BACKGROUND (YES/NO): YES YES/NO
KIT LOT #: 0 NUMERIC
PREGNANCY TEST, URINE: NEGATIVE

## 2020-08-22 PROCEDURE — 96372 THER/PROPH/DIAG INJ SC/IM: CPT | Performed by: ADVANCED PRACTICE MIDWIFE

## 2020-08-22 PROCEDURE — 3078F DIAST BP <80 MM HG: CPT | Performed by: ADVANCED PRACTICE MIDWIFE

## 2020-08-22 PROCEDURE — 81025 URINE PREGNANCY TEST: CPT | Performed by: ADVANCED PRACTICE MIDWIFE

## 2020-08-22 PROCEDURE — 3074F SYST BP LT 130 MM HG: CPT | Performed by: ADVANCED PRACTICE MIDWIFE

## 2020-08-22 RX ADMIN — MEDROXYPROGESTERONE ACETATE 150 MG: 150 INJECTION, SUSPENSION INTRAMUSCULAR at 11:53:00

## 2020-08-22 NOTE — TELEPHONE ENCOUNTER
Patient in the office today for depo. Patient stated she read an article on depo indicating that after 2 years of being on the injection, one should take a break and consider a different type of birth control.  Patient would like to know if its recommended

## 2020-08-22 NOTE — PROGRESS NOTES
Patient had no complaints. Information on  When to come back given to patient. Patient should come back between 11/7-11/21.  Gc/chl done off patient's urine

## 2020-08-24 LAB
C TRACH DNA SPEC QL NAA+PROBE: NEGATIVE
N GONORRHOEA DNA SPEC QL NAA+PROBE: NEGATIVE

## 2020-08-24 NOTE — TELEPHONE ENCOUNTER
It is absolutely ok to continue Depo provera beyond 2 years according to José Colmenares of Ob/Gyn. If she would like to discuss other options let me know.

## 2020-09-19 ENCOUNTER — HOSPITAL ENCOUNTER (EMERGENCY)
Facility: HOSPITAL | Age: 20
Discharge: HOME | End: 2020-09-19
Attending: EMERGENCY MEDICINE
Payer: COMMERCIAL

## 2020-09-19 DIAGNOSIS — N30.01 ACUTE CYSTITIS WITH HEMATURIA: Primary | ICD-10-CM

## 2020-09-19 LAB
ANION GAP SERPL CALC-SCNC: 7 MMOL/L (ref 0–18)
BASOPHILS # BLD: 0 X10(3) UL (ref 0–0.2)
BASOPHILS NFR BLD: 0 %
BILIRUB UR QL: NEGATIVE
BUN BLD-MCNC: 16 MG/DL (ref 7–18)
BUN/CREAT SERPL: 16.5 (ref 10–20)
CALCIUM BLD-MCNC: 9.5 MG/DL (ref 8.5–10.1)
CHLORIDE SERPL-SCNC: 109 MMOL/L (ref 98–112)
CO2 SERPL-SCNC: 25 MMOL/L (ref 21–32)
COLOR UR: YELLOW
CREAT BLD-MCNC: 0.97 MG/DL
DEPRECATED RDW RBC AUTO: 35.8 FL (ref 35.1–46.3)
EOSINOPHIL # BLD: 0.3 X10(3) UL (ref 0–0.7)
EOSINOPHIL NFR BLD: 2 %
ERYTHROCYTE [DISTWIDTH] IN BLOOD BY AUTOMATED COUNT: 11.2 % (ref 11–15)
GLUCOSE BLD-MCNC: 100 MG/DL (ref 70–99)
GLUCOSE UR-MCNC: NEGATIVE MG/DL
HCT VFR BLD AUTO: 41.4 %
HGB BLD-MCNC: 14.5 G/DL
KETONES UR-MCNC: NEGATIVE MG/DL
LYMPHOCYTES NFR BLD: 44 %
LYMPHOCYTES NFR BLD: 6.75 X10(3) UL (ref 1–4)
MCH RBC QN AUTO: 30.4 PG (ref 26–34)
MCHC RBC AUTO-ENTMCNC: 35 G/DL (ref 31–37)
MCV RBC AUTO: 86.8 FL
MONOCYTES # BLD: 0.9 X10(3) UL (ref 0.1–1)
MONOCYTES NFR BLD: 6 %
MORPHOLOGY: NORMAL
NEUTROPHILS # BLD AUTO: 8.58 X10 (3) UL (ref 1.5–7.7)
NEUTROPHILS NFR BLD: 45 %
NEUTS BAND NFR BLD: 2 %
NEUTS HYPERSEG # BLD: 7.05 X10(3) UL (ref 1.5–7.7)
NITRITE UR QL STRIP.AUTO: NEGATIVE
OSMOLALITY SERPL CALC.SUM OF ELEC: 293 MOSM/KG (ref 275–295)
PH UR: 6 [PH] (ref 5–8)
PLATELET # BLD AUTO: 331 10(3)UL (ref 150–450)
PLATELET MORPHOLOGY: NORMAL
POTASSIUM SERPL-SCNC: 3.7 MMOL/L (ref 3.5–5.1)
PROT UR-MCNC: 100 MG/DL
RBC # BLD AUTO: 4.77 X10(6)UL
RBC #/AREA URNS AUTO: 321 /HPF
SODIUM SERPL-SCNC: 141 MMOL/L (ref 136–145)
SP GR UR STRIP: 1.03 (ref 1–1.03)
TOTAL CELLS COUNTED: 100
UROBILINOGEN UR STRIP-ACNC: <2
VARIANT LYMPHS NFR BLD MANUAL: 1 %
WBC # BLD AUTO: 15 X10(3) UL (ref 4–11)
WBC #/AREA URNS AUTO: 376 /HPF

## 2020-09-19 PROCEDURE — 85027 COMPLETE CBC AUTOMATED: CPT | Performed by: EMERGENCY MEDICINE

## 2020-09-19 PROCEDURE — 87147 CULTURE TYPE IMMUNOLOGIC: CPT | Performed by: EMERGENCY MEDICINE

## 2020-09-19 PROCEDURE — 85060 BLOOD SMEAR INTERPRETATION: CPT | Performed by: EMERGENCY MEDICINE

## 2020-09-19 PROCEDURE — 83880 ASSAY OF NATRIURETIC PEPTIDE: CPT | Performed by: EMERGENCY MEDICINE

## 2020-09-19 PROCEDURE — 85007 BL SMEAR W/DIFF WBC COUNT: CPT | Performed by: EMERGENCY MEDICINE

## 2020-09-19 PROCEDURE — 80048 BASIC METABOLIC PNL TOTAL CA: CPT | Performed by: EMERGENCY MEDICINE

## 2020-09-19 PROCEDURE — 81001 URINALYSIS AUTO W/SCOPE: CPT | Performed by: EMERGENCY MEDICINE

## 2020-09-19 PROCEDURE — 99283 EMERGENCY DEPT VISIT LOW MDM: CPT

## 2020-09-19 PROCEDURE — 36415 COLL VENOUS BLD VENIPUNCTURE: CPT

## 2020-09-19 PROCEDURE — 85025 COMPLETE CBC W/AUTO DIFF WBC: CPT | Performed by: EMERGENCY MEDICINE

## 2020-09-19 PROCEDURE — 81025 URINE PREGNANCY TEST: CPT | Performed by: EMERGENCY MEDICINE

## 2020-09-19 PROCEDURE — 87077 CULTURE AEROBIC IDENTIFY: CPT | Performed by: EMERGENCY MEDICINE

## 2020-09-19 PROCEDURE — 87086 URINE CULTURE/COLONY COUNT: CPT | Performed by: EMERGENCY MEDICINE

## 2020-09-19 RX ORDER — CEPHALEXIN 500 MG/1
500 CAPSULE ORAL ONCE
Status: COMPLETED | OUTPATIENT
Start: 2020-09-19 | End: 2020-09-19

## 2020-09-19 RX ORDER — CEPHALEXIN 500 MG/1
500 CAPSULE ORAL 2 TIMES DAILY
Qty: 14 CAPSULE | Refills: 0 | Status: SHIPPED | OUTPATIENT
Start: 2020-09-19 | End: 2020-09-25

## 2020-09-19 NOTE — ED PROVIDER NOTES
Patient Seen in: [facility] AND [facility] Emergency Department    History   No chief complaint on file.     Stated Complaint: urinary complain     HPI    70-year-old female without past medical history present for evaluation of approximately 1 day of hematuria/dys src    SpO2    O2 Device        Current:There were no vitals taken for this visit. Physical Exam   Constitutional: No distress. Nontoxic, well-appearing. HEENT: MMM. Head: Normocephalic. Eyes: No injection. Cardiovascular: RRR.    Pulmonary/Ch further PPE details. Disposition and Plan     Clinical Impression:  Acute cystitis with hematuria  (primary encounter diagnosis)    Disposition:  Discharge    Follow-up:  Your PCP    Call  For followup and re-evaluation.       Medications Prescribed:  Cu

## 2020-09-25 RX ORDER — CEPHALEXIN 500 MG/1
500 CAPSULE ORAL 2 TIMES DAILY
Qty: 8 CAPSULE | Refills: 0 | Status: SHIPPED | OUTPATIENT
Start: 2020-09-25 | End: 2020-09-29

## 2020-11-12 ENCOUNTER — TELEPHONE (OUTPATIENT)
Dept: OBGYN CLINIC | Facility: CLINIC | Age: 20
End: 2020-11-12

## 2020-11-14 ENCOUNTER — NURSE ONLY (OUTPATIENT)
Dept: OBGYN CLINIC | Facility: CLINIC | Age: 20
End: 2020-11-14
Payer: MEDICAID

## 2020-11-14 VITALS — DIASTOLIC BLOOD PRESSURE: 74 MMHG | HEART RATE: 75 BPM | SYSTOLIC BLOOD PRESSURE: 112 MMHG

## 2020-11-14 DIAGNOSIS — Z30.09 FAMILY PLANNING: Primary | ICD-10-CM

## 2020-11-14 PROCEDURE — 96372 THER/PROPH/DIAG INJ SC/IM: CPT | Performed by: ADVANCED PRACTICE MIDWIFE

## 2020-11-14 PROCEDURE — 3078F DIAST BP <80 MM HG: CPT | Performed by: ADVANCED PRACTICE MIDWIFE

## 2020-11-14 PROCEDURE — 3074F SYST BP LT 130 MM HG: CPT | Performed by: ADVANCED PRACTICE MIDWIFE

## 2020-11-14 PROCEDURE — 81025 URINE PREGNANCY TEST: CPT | Performed by: ADVANCED PRACTICE MIDWIFE

## 2020-11-14 RX ADMIN — MEDROXYPROGESTERONE ACETATE 150 MG: 150 INJECTION, SUSPENSION INTRAMUSCULAR at 09:29:00

## 2020-11-14 NOTE — PROGRESS NOTES
Pt given Depo Provera 150 mg injection. Pt tolerated injection well. Pt advised to return to clinic for next Depo injection on 01/30/21-02/13/21. Pt agreed and voiced understanding.

## 2021-02-04 ENCOUNTER — TELEPHONE (OUTPATIENT)
Dept: OBGYN CLINIC | Facility: CLINIC | Age: 21
End: 2021-02-04

## 2021-02-06 ENCOUNTER — NURSE ONLY (OUTPATIENT)
Dept: OBGYN CLINIC | Facility: CLINIC | Age: 21
End: 2021-02-06
Payer: MEDICAID

## 2021-02-06 VITALS — DIASTOLIC BLOOD PRESSURE: 74 MMHG | HEART RATE: 97 BPM | SYSTOLIC BLOOD PRESSURE: 109 MMHG

## 2021-02-06 DIAGNOSIS — Z30.09 FAMILY PLANNING: Primary | ICD-10-CM

## 2021-02-06 LAB
CONTROL LINE PRESENT WITH A CLEAR BACKGROUND (YES/NO): YES YES/NO
PREGNANCY TEST, URINE: NEGATIVE

## 2021-02-06 PROCEDURE — 3074F SYST BP LT 130 MM HG: CPT | Performed by: ADVANCED PRACTICE MIDWIFE

## 2021-02-06 PROCEDURE — 96372 THER/PROPH/DIAG INJ SC/IM: CPT | Performed by: ADVANCED PRACTICE MIDWIFE

## 2021-02-06 PROCEDURE — 3078F DIAST BP <80 MM HG: CPT | Performed by: ADVANCED PRACTICE MIDWIFE

## 2021-02-06 PROCEDURE — 81025 URINE PREGNANCY TEST: CPT | Performed by: ADVANCED PRACTICE MIDWIFE

## 2021-02-06 RX ADMIN — MEDROXYPROGESTERONE ACETATE 150 MG: 150 INJECTION, SUSPENSION INTRAMUSCULAR at 12:23:00

## 2021-02-06 NOTE — PROGRESS NOTES
Depo Injection given as ordered. Pt tolerated injection well. Pt instructed to return on time for next dose due 4/24/21-5/8/21 and  handout given. Pt agreed and voiced understanding.

## 2021-04-22 ENCOUNTER — TELEPHONE (OUTPATIENT)
Dept: OBGYN CLINIC | Facility: CLINIC | Age: 21
End: 2021-04-22

## 2021-04-24 ENCOUNTER — NURSE ONLY (OUTPATIENT)
Dept: OBGYN CLINIC | Facility: CLINIC | Age: 21
End: 2021-04-24
Payer: MEDICAID

## 2021-04-24 VITALS — SYSTOLIC BLOOD PRESSURE: 126 MMHG | DIASTOLIC BLOOD PRESSURE: 69 MMHG | HEART RATE: 102 BPM

## 2021-04-24 DIAGNOSIS — Z30.42 FAMILY PLANNING, DEPO-PROVERA CONTRACEPTION MONITORING/ADMINISTRATION: Primary | ICD-10-CM

## 2021-04-24 PROCEDURE — 96372 THER/PROPH/DIAG INJ SC/IM: CPT | Performed by: ADVANCED PRACTICE MIDWIFE

## 2021-04-24 PROCEDURE — 81025 URINE PREGNANCY TEST: CPT | Performed by: ADVANCED PRACTICE MIDWIFE

## 2021-04-24 PROCEDURE — 3074F SYST BP LT 130 MM HG: CPT | Performed by: ADVANCED PRACTICE MIDWIFE

## 2021-04-24 PROCEDURE — 3078F DIAST BP <80 MM HG: CPT | Performed by: ADVANCED PRACTICE MIDWIFE

## 2021-04-24 RX ADMIN — MEDROXYPROGESTERONE ACETATE 150 MG: 150 INJECTION, SUSPENSION INTRAMUSCULAR at 09:24:00

## 2021-04-24 NOTE — PROGRESS NOTES
Pt is here for Nurse Visit for Depo Provera. Depo Provera 150mg given / Latha Perry / Nilsa Levy / Kristy Well. No adverse reaction noted or reported. Pt was advised to RTC for her Annual due 6/21 and for her next Depo Provera injection due 7/10/21 - 7/24/21.

## 2021-07-10 ENCOUNTER — OFFICE VISIT (OUTPATIENT)
Dept: OBGYN CLINIC | Facility: CLINIC | Age: 21
End: 2021-07-10
Payer: COMMERCIAL

## 2021-07-10 VITALS
BODY MASS INDEX: 28.55 KG/M2 | DIASTOLIC BLOOD PRESSURE: 86 MMHG | SYSTOLIC BLOOD PRESSURE: 125 MMHG | HEART RATE: 99 BPM | HEIGHT: 61 IN | WEIGHT: 151.19 LBS

## 2021-07-10 DIAGNOSIS — Z01.419 WOMEN'S ANNUAL ROUTINE GYNECOLOGICAL EXAMINATION: Primary | ICD-10-CM

## 2021-07-10 DIAGNOSIS — Z30.42 ENCOUNTER FOR SURVEILLANCE OF INJECTABLE CONTRACEPTIVE: ICD-10-CM

## 2021-07-10 DIAGNOSIS — Z11.3 SCREEN FOR STD (SEXUALLY TRANSMITTED DISEASE): ICD-10-CM

## 2021-07-10 DIAGNOSIS — Z12.83 SKIN CANCER SCREENING: ICD-10-CM

## 2021-07-10 DIAGNOSIS — Z30.42 ON DEPO-PROVERA FOR CONTRACEPTION: ICD-10-CM

## 2021-07-10 PROCEDURE — 3074F SYST BP LT 130 MM HG: CPT | Performed by: ADVANCED PRACTICE MIDWIFE

## 2021-07-10 PROCEDURE — 99395 PREV VISIT EST AGE 18-39: CPT | Performed by: ADVANCED PRACTICE MIDWIFE

## 2021-07-10 PROCEDURE — 3008F BODY MASS INDEX DOCD: CPT | Performed by: ADVANCED PRACTICE MIDWIFE

## 2021-07-10 PROCEDURE — 81025 URINE PREGNANCY TEST: CPT | Performed by: ADVANCED PRACTICE MIDWIFE

## 2021-07-10 PROCEDURE — 3079F DIAST BP 80-89 MM HG: CPT | Performed by: ADVANCED PRACTICE MIDWIFE

## 2021-07-10 PROCEDURE — 96372 THER/PROPH/DIAG INJ SC/IM: CPT | Performed by: ADVANCED PRACTICE MIDWIFE

## 2021-07-10 RX ORDER — MEDROXYPROGESTERONE ACETATE 150 MG/ML
150 INJECTION, SUSPENSION INTRAMUSCULAR ONCE
Status: COMPLETED | OUTPATIENT
Start: 2021-07-10 | End: 2021-07-10

## 2021-07-10 RX ADMIN — MEDROXYPROGESTERONE ACETATE 150 MG: 150 INJECTION, SUSPENSION INTRAMUSCULAR at 11:50:00

## 2021-07-10 NOTE — PROGRESS NOTES
Ramón Cesar is a 23year old female. HPI:             Ramón Cesar is a 23year old female. [id number] No LMP recorded. Patient has had an injection. Patient presents with:  Gyn Exam: Annual exam. LPS 6/2/20 negative.  Reports noting birth freckles     Neurological: Negative. Psychiatric/Behavioral: Negative. PHYSICAL EXAM:      07/10/21  1105   BP: 125/86   Pulse: 99     Physical Exam  Vitals reviewed. Constitutional:       General: She is not in acute distress.      Appearance: Lymphadenopathy:      Lower Body: No right inguinal adenopathy. No left inguinal adenopathy. Neurological:      Mental Status: She is alert and oriented to person, place, and time.    Psychiatric:         Speech: Speech normal.         Behavior: Rodell Robin

## 2021-07-10 NOTE — PROGRESS NOTES
Depo given as ordered. Pt tolerated it well. Instructed to return on time for next dose due 9/25-10/9/21 & handout given.  Pt verbalized an understanding & agrees w/ plan

## 2021-07-12 LAB
C TRACH DNA SPEC QL NAA+PROBE: NEGATIVE
N GONORRHOEA DNA SPEC QL NAA+PROBE: NEGATIVE

## 2021-08-09 ENCOUNTER — TELEPHONE (OUTPATIENT)
Dept: OBGYN CLINIC | Facility: CLINIC | Age: 21
End: 2021-08-09

## 2021-08-09 NOTE — TELEPHONE ENCOUNTER
Spoke to patient advised overdue labs. Patient stated she as in a hurry when she was in and she forgot to get them done. Lab hours given to patient.  Patient agrees with plan patient verbally understands

## 2021-09-22 ENCOUNTER — TELEMEDICINE (OUTPATIENT)
Dept: INTERNAL MEDICINE CLINIC | Facility: CLINIC | Age: 21
End: 2021-09-22
Payer: COMMERCIAL

## 2021-09-22 DIAGNOSIS — J01.00 ACUTE MAXILLARY SINUSITIS, RECURRENCE NOT SPECIFIED: Primary | ICD-10-CM

## 2021-09-22 PROCEDURE — 99213 OFFICE O/P EST LOW 20 MIN: CPT | Performed by: NURSE PRACTITIONER

## 2021-09-22 RX ORDER — AMOXICILLIN AND CLAVULANATE POTASSIUM 875; 125 MG/1; MG/1
1 TABLET, FILM COATED ORAL 2 TIMES DAILY
Qty: 14 TABLET | Refills: 0 | Status: SHIPPED | OUTPATIENT
Start: 2021-09-22 | End: 2021-09-29

## 2021-09-22 NOTE — PROGRESS NOTES
Video Check-In    Srinivasa Gaston verbally consents to a Video Check-In visit on 09/22/21. Patient understands and accepts financial responsibility for any deductible, co-insurance and/or co-pays associated with this service.     Justyna Kearns is in weight loss, loss of appetite  Neurological: Denies frequent headaches  Cardiovascular: Denies leg swelling, chest pain or pressure after eating or when upset, irregular heart rate/palpitations, dizziness, N,V, exertional arm pain nor neck pain  Patient is

## 2021-10-02 ENCOUNTER — NURSE ONLY (OUTPATIENT)
Dept: OBGYN CLINIC | Facility: CLINIC | Age: 21
End: 2021-10-02
Payer: COMMERCIAL

## 2021-10-02 VITALS — SYSTOLIC BLOOD PRESSURE: 107 MMHG | DIASTOLIC BLOOD PRESSURE: 71 MMHG | HEART RATE: 82 BPM

## 2021-10-02 DIAGNOSIS — Z30.42 ENCOUNTER FOR DEPO-PROVERA CONTRACEPTION: Primary | ICD-10-CM

## 2021-10-02 PROCEDURE — 3078F DIAST BP <80 MM HG: CPT | Performed by: ADVANCED PRACTICE MIDWIFE

## 2021-10-02 PROCEDURE — 96372 THER/PROPH/DIAG INJ SC/IM: CPT | Performed by: ADVANCED PRACTICE MIDWIFE

## 2021-10-02 PROCEDURE — 3074F SYST BP LT 130 MM HG: CPT | Performed by: ADVANCED PRACTICE MIDWIFE

## 2021-10-02 PROCEDURE — 81025 URINE PREGNANCY TEST: CPT | Performed by: ADVANCED PRACTICE MIDWIFE

## 2021-10-02 RX ADMIN — MEDROXYPROGESTERONE ACETATE 150 MG: 150 INJECTION, SUSPENSION INTRAMUSCULAR at 10:19:00

## 2021-10-02 NOTE — PROGRESS NOTES
Depo injection given as ordered. Pt tolerated injection well. Pt instructed to return on time for next dose due 12/18/21-1/1/22 and handout given. Pt agreed and voiced understanding.

## 2021-11-09 ENCOUNTER — TELEPHONE (OUTPATIENT)
Dept: OBGYN CLINIC | Facility: CLINIC | Age: 21
End: 2021-11-09

## 2021-11-09 NOTE — TELEPHONE ENCOUNTER
Spoke with pt and reminded pt of overdue STI labs. Pt agreed and voiced understanding, stated she will go to the lab to complete.

## 2021-12-10 ENCOUNTER — TELEPHONE (OUTPATIENT)
Dept: OBGYN CLINIC | Facility: CLINIC | Age: 21
End: 2021-12-10

## 2021-12-13 NOTE — TELEPHONE ENCOUNTER
Spoke with pt and reminded pt of overdue STI labs. Pt stated she is planning on completing labs when she comes in for her next Depo. Appt for Depo scheduled. Pt agreed and voiced understanding.

## 2021-12-22 ENCOUNTER — LAB ENCOUNTER (OUTPATIENT)
Dept: LAB | Facility: HOSPITAL | Age: 21
End: 2021-12-22
Attending: ADVANCED PRACTICE MIDWIFE
Payer: COMMERCIAL

## 2021-12-22 ENCOUNTER — NURSE ONLY (OUTPATIENT)
Dept: OBGYN CLINIC | Facility: CLINIC | Age: 21
End: 2021-12-22
Payer: COMMERCIAL

## 2021-12-22 VITALS
HEART RATE: 89 BPM | WEIGHT: 152 LBS | SYSTOLIC BLOOD PRESSURE: 126 MMHG | DIASTOLIC BLOOD PRESSURE: 88 MMHG | BODY MASS INDEX: 29 KG/M2

## 2021-12-22 DIAGNOSIS — Z11.3 SCREEN FOR STD (SEXUALLY TRANSMITTED DISEASE): ICD-10-CM

## 2021-12-22 DIAGNOSIS — Z30.42 ENCOUNTER FOR MANAGEMENT AND INJECTION OF DEPO-PROVERA: Primary | ICD-10-CM

## 2021-12-22 PROCEDURE — 81025 URINE PREGNANCY TEST: CPT | Performed by: ADVANCED PRACTICE MIDWIFE

## 2021-12-22 PROCEDURE — 86803 HEPATITIS C AB TEST: CPT

## 2021-12-22 PROCEDURE — 36415 COLL VENOUS BLD VENIPUNCTURE: CPT

## 2021-12-22 PROCEDURE — 96372 THER/PROPH/DIAG INJ SC/IM: CPT | Performed by: ADVANCED PRACTICE MIDWIFE

## 2021-12-22 PROCEDURE — 87340 HEPATITIS B SURFACE AG IA: CPT

## 2021-12-22 PROCEDURE — 87389 HIV-1 AG W/HIV-1&-2 AB AG IA: CPT

## 2021-12-22 PROCEDURE — 86780 TREPONEMA PALLIDUM: CPT

## 2021-12-22 RX ADMIN — MEDROXYPROGESTERONE ACETATE 150 MG: 150 INJECTION, SUSPENSION INTRAMUSCULAR at 18:38:00

## 2021-12-23 NOTE — PROGRESS NOTES
Patient here to for depo injection. Patient doing well. Patient given injection and tolerated injection well. Patient given instruction on when to come back.  Patient is next due 3/9/22-3/23/22

## 2022-03-11 ENCOUNTER — TELEPHONE (OUTPATIENT)
Dept: OBGYN CLINIC | Facility: CLINIC | Age: 22
End: 2022-03-11

## 2022-03-11 NOTE — TELEPHONE ENCOUNTER
Depo time frame is 3/9-3/23. Patient desires appt for Saturday. appt scheduled for 3/12.  Patient agrees with plan and verbally understands

## 2022-03-12 ENCOUNTER — NURSE ONLY (OUTPATIENT)
Dept: OBGYN CLINIC | Facility: CLINIC | Age: 22
End: 2022-03-12
Payer: COMMERCIAL

## 2022-03-12 VITALS
BODY MASS INDEX: 27.6 KG/M2 | SYSTOLIC BLOOD PRESSURE: 127 MMHG | DIASTOLIC BLOOD PRESSURE: 82 MMHG | WEIGHT: 150 LBS | HEIGHT: 62 IN | HEART RATE: 82 BPM

## 2022-03-12 DIAGNOSIS — Z30.42 ENCOUNTER FOR MANAGEMENT AND INJECTION OF DEPO-PROVERA: Primary | ICD-10-CM

## 2022-03-12 LAB
CONTROL LINE PRESENT WITH A CLEAR BACKGROUND (YES/NO): YES YES/NO
PREGNANCY TEST, URINE: NEGATIVE

## 2022-03-12 PROCEDURE — 96372 THER/PROPH/DIAG INJ SC/IM: CPT | Performed by: ADVANCED PRACTICE MIDWIFE

## 2022-03-12 PROCEDURE — 81025 URINE PREGNANCY TEST: CPT | Performed by: ADVANCED PRACTICE MIDWIFE

## 2022-03-12 RX ORDER — MEDROXYPROGESTERONE ACETATE 150 MG/ML
150 INJECTION, SUSPENSION INTRAMUSCULAR ONCE
Status: COMPLETED | OUTPATIENT
Start: 2022-03-12 | End: 2022-03-12

## 2022-03-12 RX ADMIN — MEDROXYPROGESTERONE ACETATE 150 MG: 150 INJECTION, SUSPENSION INTRAMUSCULAR at 11:20:00

## 2022-06-02 ENCOUNTER — TELEPHONE (OUTPATIENT)
Dept: OBGYN CLINIC | Facility: CLINIC | Age: 22
End: 2022-06-02

## 2022-06-02 NOTE — TELEPHONE ENCOUNTER
Depo window 5/28-6/11/22. Due for annual exam in 7/2022. Depo scheduled.  Pt verbalized an understanding & agrees w/ plan

## 2022-06-04 ENCOUNTER — NURSE ONLY (OUTPATIENT)
Dept: OBGYN CLINIC | Facility: CLINIC | Age: 22
End: 2022-06-04
Payer: COMMERCIAL

## 2022-06-04 VITALS
SYSTOLIC BLOOD PRESSURE: 115 MMHG | DIASTOLIC BLOOD PRESSURE: 76 MMHG | BODY MASS INDEX: 26.68 KG/M2 | HEART RATE: 73 BPM | WEIGHT: 145 LBS | HEIGHT: 62 IN

## 2022-06-04 DIAGNOSIS — Z30.42 ENCOUNTER FOR MANAGEMENT AND INJECTION OF DEPO-PROVERA: Primary | ICD-10-CM

## 2022-06-04 LAB
CONTROL LINE PRESENT WITH A CLEAR BACKGROUND (YES/NO): YES YES/NO
PREGNANCY TEST, URINE: NEGATIVE

## 2022-06-04 PROCEDURE — 81025 URINE PREGNANCY TEST: CPT | Performed by: ADVANCED PRACTICE MIDWIFE

## 2022-06-04 RX ADMIN — MEDROXYPROGESTERONE ACETATE 150 MG: 150 INJECTION, SUSPENSION INTRAMUSCULAR at 09:09:00

## 2022-06-04 NOTE — PROGRESS NOTES
Patient here to for depo injection. Injection was given on left ventrogluteal and tolerated injection well.  Patient given instruction to come back from August 20,2022-September 3, 2022

## 2022-07-19 ENCOUNTER — LAB ENCOUNTER (OUTPATIENT)
Dept: LAB | Age: 22
End: 2022-07-19
Attending: PHYSICIAN ASSISTANT
Payer: COMMERCIAL

## 2022-07-19 ENCOUNTER — OFFICE VISIT (OUTPATIENT)
Dept: DERMATOLOGY CLINIC | Facility: CLINIC | Age: 22
End: 2022-07-19
Payer: COMMERCIAL

## 2022-07-19 DIAGNOSIS — B35.1 ONYCHOMYCOSIS: ICD-10-CM

## 2022-07-19 DIAGNOSIS — L50.8 HIVES, PHYSICAL: ICD-10-CM

## 2022-07-19 DIAGNOSIS — Z12.83 SCREENING EXAM FOR SKIN CANCER: Primary | ICD-10-CM

## 2022-07-19 LAB
ALBUMIN SERPL-MCNC: 4 G/DL (ref 3.4–5)
ALBUMIN/GLOB SERPL: 1.2 {RATIO} (ref 1–2)
ALP LIVER SERPL-CCNC: 79 U/L
ALT SERPL-CCNC: 26 U/L
ANION GAP SERPL CALC-SCNC: 9 MMOL/L (ref 0–18)
AST SERPL-CCNC: 13 U/L (ref 15–37)
BILIRUB SERPL-MCNC: 0.8 MG/DL (ref 0.1–2)
BUN BLD-MCNC: 10 MG/DL (ref 7–18)
BUN/CREAT SERPL: 13 (ref 10–20)
CALCIUM BLD-MCNC: 9.4 MG/DL (ref 8.5–10.1)
CHLORIDE SERPL-SCNC: 106 MMOL/L (ref 98–112)
CO2 SERPL-SCNC: 24 MMOL/L (ref 21–32)
CREAT BLD-MCNC: 0.77 MG/DL
FASTING STATUS PATIENT QL REPORTED: NO
GLOBULIN PLAS-MCNC: 3.3 G/DL (ref 2.8–4.4)
GLUCOSE BLD-MCNC: 93 MG/DL (ref 70–99)
OSMOLALITY SERPL CALC.SUM OF ELEC: 287 MOSM/KG (ref 275–295)
POTASSIUM SERPL-SCNC: 4.1 MMOL/L (ref 3.5–5.1)
PROT SERPL-MCNC: 7.3 G/DL (ref 6.4–8.2)
SODIUM SERPL-SCNC: 139 MMOL/L (ref 136–145)

## 2022-07-19 PROCEDURE — 80053 COMPREHEN METABOLIC PANEL: CPT

## 2022-07-19 PROCEDURE — 99213 OFFICE O/P EST LOW 20 MIN: CPT | Performed by: PHYSICIAN ASSISTANT

## 2022-07-19 PROCEDURE — 36415 COLL VENOUS BLD VENIPUNCTURE: CPT

## 2022-07-19 NOTE — PROGRESS NOTES
HPI:    Patient ID: Racquel Nelson is a 19year old female. Patient presents for full body skin exam. Sanjay Kingdom any personal or family hx of skin cancer. No allergies to medications noted. She has not had moles removed in the past. Mostly has moles of concern on the breast and torso area. She does wear sunscreen when she can. She does get hives when she sweats and eats certain foods. Mostly with breads and oils. She also has nail fungus. She has not had any treatment for this. No allergies to medications noted. Review of Systems   Constitutional: Negative for chills and fever. Musculoskeletal: Negative for arthralgias and myalgias. Skin: Positive for rash. Negative for color change and wound. No current outpatient medications on file. Allergies:No Known Allergies   There were no vitals taken for this visit. There is no height or weight on file to calculate BMI. PHYSICAL EXAM:   Physical Exam  Constitutional:       General: She is not in acute distress. Appearance: Normal appearance. Skin:     General: Skin is warm and dry. Findings: Rash present. Comments: Moles on chest are homogenous in color. No draining. No tenderness noted. No scaling. Borders are regular. About 3-4 mm in size. No hives currently during the visit. She did show a video on her phone of the hives on her face where they only occur. Nail fungus noted on both toes of both feet. No draining. No tenderness noted. Thickened yellow nails noted. Neurological:      Mental Status: She is alert and oriented to person, place, and time. ASSESSMENT/PLAN:   1. Screening exam for skin cancer  -After discussion with patient, advised the following:  Reassurance regarding other benign skin lesions. Signs and symptoms of skin cancer, ABCDE's of melanoma discussed with patient. Sunscreen use, sun protection, self exams reviewed. Followup as noted RTC ---routine checkup 6 mos -one year or p.r.n. -Pt was agreeable to plan and will comply with discussion above. 2. Onychomycosis  -After discussion with patient, advised the following:  -Ordered CMP  _Will call with results  -Once results are normal we will send lamsil for the patient to try  -1 250 mg pill daily for 30 days.   -To call or follow-up with worsening symptoms or concerns.   -Pt was agreeable to plan and will comply with discussion above. - COMP METABOLIC PANEL (14); Future    3. Hives, physical  -After discussion with patient, advised the following:  -Should take daily antihistamine  -To call or follow-up with worsening symptoms or concerns.   -Not improving will send to allergy  -Pt was agreeable to plan and will comply with discussion above.          Orders Placed This Encounter      Comp Metabolic Panel (14)      Meds This Visit:  Requested Prescriptions      No prescriptions requested or ordered in this encounter       Imaging & Referrals:  None         LA#[id number]

## 2022-07-26 ENCOUNTER — LAB ENCOUNTER (OUTPATIENT)
Dept: LAB | Facility: HOSPITAL | Age: 22
End: 2022-07-26
Attending: ADVANCED PRACTICE MIDWIFE
Payer: COMMERCIAL

## 2022-07-26 ENCOUNTER — OFFICE VISIT (OUTPATIENT)
Dept: OBGYN CLINIC | Facility: CLINIC | Age: 22
End: 2022-07-26
Payer: COMMERCIAL

## 2022-07-26 VITALS
WEIGHT: 139 LBS | DIASTOLIC BLOOD PRESSURE: 75 MMHG | HEIGHT: 62 IN | HEART RATE: 90 BPM | SYSTOLIC BLOOD PRESSURE: 110 MMHG | BODY MASS INDEX: 25.58 KG/M2

## 2022-07-26 DIAGNOSIS — M54.2 NECK PAIN: ICD-10-CM

## 2022-07-26 DIAGNOSIS — R42 DIZZINESS: ICD-10-CM

## 2022-07-26 DIAGNOSIS — R53.83 FATIGUE, UNSPECIFIED TYPE: ICD-10-CM

## 2022-07-26 DIAGNOSIS — Z01.419 WOMEN'S ANNUAL ROUTINE GYNECOLOGICAL EXAMINATION: Primary | ICD-10-CM

## 2022-07-26 DIAGNOSIS — Z30.09 BIRTH CONTROL COUNSELING: ICD-10-CM

## 2022-07-26 LAB
ALBUMIN SERPL-MCNC: 4.3 G/DL (ref 3.4–5)
ALBUMIN/GLOB SERPL: 1.2 {RATIO} (ref 1–2)
ALP LIVER SERPL-CCNC: 92 U/L
ALT SERPL-CCNC: 25 U/L
ANION GAP SERPL CALC-SCNC: 10 MMOL/L (ref 0–18)
AST SERPL-CCNC: 12 U/L (ref 15–37)
BILIRUB SERPL-MCNC: 0.6 MG/DL (ref 0.1–2)
BUN BLD-MCNC: 18 MG/DL (ref 7–18)
BUN/CREAT SERPL: 22.8 (ref 10–20)
CALCIUM BLD-MCNC: 9.1 MG/DL (ref 8.5–10.1)
CHLORIDE SERPL-SCNC: 106 MMOL/L (ref 98–112)
CO2 SERPL-SCNC: 24 MMOL/L (ref 21–32)
CREAT BLD-MCNC: 0.79 MG/DL
DEPRECATED RDW RBC AUTO: 36.7 FL (ref 35.1–46.3)
ERYTHROCYTE [DISTWIDTH] IN BLOOD BY AUTOMATED COUNT: 11.4 % (ref 11–15)
FASTING STATUS PATIENT QL REPORTED: NO
GLOBULIN PLAS-MCNC: 3.5 G/DL (ref 2.8–4.4)
GLUCOSE BLD-MCNC: 82 MG/DL (ref 70–99)
HCT VFR BLD AUTO: 42.2 %
HGB BLD-MCNC: 14.3 G/DL
MCH RBC QN AUTO: 29.9 PG (ref 26–34)
MCHC RBC AUTO-ENTMCNC: 33.9 G/DL (ref 31–37)
MCV RBC AUTO: 88.1 FL
OSMOLALITY SERPL CALC.SUM OF ELEC: 291 MOSM/KG (ref 275–295)
PLATELET # BLD AUTO: 283 10(3)UL (ref 150–450)
POTASSIUM SERPL-SCNC: 4 MMOL/L (ref 3.5–5.1)
PROT SERPL-MCNC: 7.8 G/DL (ref 6.4–8.2)
RBC # BLD AUTO: 4.79 X10(6)UL
SODIUM SERPL-SCNC: 140 MMOL/L (ref 136–145)
VIT D+METAB SERPL-MCNC: 24.8 NG/ML (ref 30–100)
WBC # BLD AUTO: 9.2 X10(3) UL (ref 4–11)

## 2022-07-26 PROCEDURE — 82306 VITAMIN D 25 HYDROXY: CPT

## 2022-07-26 PROCEDURE — 3008F BODY MASS INDEX DOCD: CPT | Performed by: ADVANCED PRACTICE MIDWIFE

## 2022-07-26 PROCEDURE — 85027 COMPLETE CBC AUTOMATED: CPT

## 2022-07-26 PROCEDURE — 36415 COLL VENOUS BLD VENIPUNCTURE: CPT

## 2022-07-26 PROCEDURE — 99395 PREV VISIT EST AGE 18-39: CPT | Performed by: ADVANCED PRACTICE MIDWIFE

## 2022-07-26 PROCEDURE — 80053 COMPREHEN METABOLIC PANEL: CPT

## 2022-07-26 PROCEDURE — 3074F SYST BP LT 130 MM HG: CPT | Performed by: ADVANCED PRACTICE MIDWIFE

## 2022-07-26 PROCEDURE — 3078F DIAST BP <80 MM HG: CPT | Performed by: ADVANCED PRACTICE MIDWIFE

## 2023-01-04 ENCOUNTER — TELEPHONE (OUTPATIENT)
Dept: OBGYN CLINIC | Facility: CLINIC | Age: 23
End: 2023-01-04

## 2023-01-04 NOTE — TELEPHONE ENCOUNTER
Pt last dose of depo 6/2022. Desires to restart. No lmp. Last unprotected intercourse about a month ago. Has not done a pregnancy test. Advised pt I will need to check w/ TM & see if pt needs a blood preg test prior or can just do a urine at appt.  Pt verbalized an understanding & agrees w/ plan

## 2023-01-04 NOTE — TELEPHONE ENCOUNTER
Spoke to patient advised per TM, ok to have depo but will need to do a urine preg test and abstain from unprotected intercourse prior to appt. Patient agrees with plan and verbally understands.  appt scheduled

## 2023-01-04 NOTE — TELEPHONE ENCOUNTER
Lmtcb. Pt can be scheduled for depo & do urine preg test in office.  Pt to abstain from unprotected intercourse prior to appt

## 2023-01-11 ENCOUNTER — NURSE ONLY (OUTPATIENT)
Dept: OBGYN CLINIC | Facility: CLINIC | Age: 23
End: 2023-01-11
Payer: COMMERCIAL

## 2023-01-11 VITALS
SYSTOLIC BLOOD PRESSURE: 111 MMHG | BODY MASS INDEX: 25 KG/M2 | DIASTOLIC BLOOD PRESSURE: 74 MMHG | WEIGHT: 139 LBS | HEART RATE: 98 BPM

## 2023-01-11 DIAGNOSIS — Z30.42 ENCOUNTER FOR MANAGEMENT AND INJECTION OF DEPO-PROVERA: Primary | ICD-10-CM

## 2023-01-11 LAB
CONTROL LINE PRESENT WITH A CLEAR BACKGROUND (YES/NO): YES YES/NO
PREGNANCY TEST, URINE: NEGATIVE

## 2023-01-11 PROCEDURE — 3078F DIAST BP <80 MM HG: CPT | Performed by: ADVANCED PRACTICE MIDWIFE

## 2023-01-11 PROCEDURE — 3074F SYST BP LT 130 MM HG: CPT | Performed by: ADVANCED PRACTICE MIDWIFE

## 2023-01-11 PROCEDURE — 81025 URINE PREGNANCY TEST: CPT | Performed by: ADVANCED PRACTICE MIDWIFE

## 2023-01-11 PROCEDURE — 96372 THER/PROPH/DIAG INJ SC/IM: CPT | Performed by: ADVANCED PRACTICE MIDWIFE

## 2023-01-11 RX ORDER — MEDROXYPROGESTERONE ACETATE 150 MG/ML
150 INJECTION, SUSPENSION INTRAMUSCULAR ONCE
Status: COMPLETED | OUTPATIENT
Start: 2023-01-11 | End: 2023-01-11

## 2023-01-11 RX ADMIN — MEDROXYPROGESTERONE ACETATE 150 MG: 150 INJECTION, SUSPENSION INTRAMUSCULAR at 19:45:00

## 2023-01-11 NOTE — PROGRESS NOTES
Patient here to for depo injection. Last injection on June 4, 2022. Patient wanted \"a break from birth control\". She decided to start again. Patient doing well. She was given injection and tolerated injection well.  Patient given instruction on when to come back between March 29, 2023 and April 12, 2023

## 2023-01-11 NOTE — PROGRESS NOTES
Subjective:   Patient ID: Angel Robles is a 23year old female. HPI    History/Other:   Review of Systems  No current outpatient medications on file.      Allergies:No Known Allergies    Objective:   Physical Exam    Assessment & Plan:   Encounter for management and injection of depo-Provera  (primary encounter diagnosis)    Orders Placed This Encounter      Urine Pregnancy Test      Meds This Visit:  Requested Prescriptions      No prescriptions requested or ordered in this encounter       Imaging & Referrals:  None

## 2023-03-07 ENCOUNTER — OFFICE VISIT (OUTPATIENT)
Dept: OBGYN CLINIC | Facility: CLINIC | Age: 23
End: 2023-03-07

## 2023-03-07 VITALS — HEART RATE: 98 BPM | DIASTOLIC BLOOD PRESSURE: 74 MMHG | SYSTOLIC BLOOD PRESSURE: 116 MMHG

## 2023-03-07 DIAGNOSIS — Z32.00 PREGNANCY EXAMINATION OR TEST, PREGNANCY UNCONFIRMED: ICD-10-CM

## 2023-03-07 DIAGNOSIS — N64.4 BREAST PAIN: Primary | ICD-10-CM

## 2023-03-07 PROCEDURE — 99213 OFFICE O/P EST LOW 20 MIN: CPT | Performed by: ADVANCED PRACTICE MIDWIFE

## 2023-03-07 PROCEDURE — 3074F SYST BP LT 130 MM HG: CPT | Performed by: ADVANCED PRACTICE MIDWIFE

## 2023-03-07 PROCEDURE — 81025 URINE PREGNANCY TEST: CPT | Performed by: ADVANCED PRACTICE MIDWIFE

## 2023-03-07 PROCEDURE — 3078F DIAST BP <80 MM HG: CPT | Performed by: ADVANCED PRACTICE MIDWIFE

## 2023-03-07 NOTE — PROGRESS NOTES
Subjective:   Patient ID: Molly Campbell is a 23year old female. Presents to clinic for breast pain L > R described as soreness. Occurs daily, comes and goes randomly. Depo for 3.5 years, prior to restarting in January, last injection in June/July. Started feeling shooting/pinching breast (nipple) pain in august or September. Didn't have period for entire time she was on depo until right before most recent injection (1/5/23). Restarted depo again in January. Performing SBEs and denies lumps, nipple discharge, swelling. 1 soda/day. Does not drink coffee/tea. No new medications. No recent new exercise routine. Denies wearing under-wire or tight-fitting bras. Breastfeeding stopped 1 year ago. Denies family hx of breast, uterine, ovarian cancer. History/Other:   Review of Systems   Constitutional: Negative. Musculoskeletal:        Breast pain     No current outpatient medications on file. Allergies:No Known Allergies    Objective:   Physical Exam  Constitutional:       General: She is not in acute distress. Appearance: Normal appearance. She is normal weight. She is not ill-appearing, toxic-appearing or diaphoretic. Pulmonary:      Effort: Pulmonary effort is normal.   Chest:      Chest wall: No mass, lacerations, deformity, swelling, tenderness, crepitus or edema. Breasts:     Breasts are symmetrical.      Right: Tenderness present. No swelling, bleeding, inverted nipple, mass, nipple discharge or skin change. Left: Swelling and tenderness present. No bleeding, inverted nipple, mass, nipple discharge or skin change. Comments: Areas of tenderness can be seen on diagram.  Lymphadenopathy:      Upper Body:      Right upper body: No supraclavicular, axillary or pectoral adenopathy. Left upper body: No supraclavicular, axillary or pectoral adenopathy. Skin:     General: Skin is warm and dry.    Neurological:      Mental Status: She is alert and oriented to person, place, and time. Psychiatric:         Mood and Affect: Mood normal.         Behavior: Behavior normal.         Thought Content: Thought content normal.         Judgment: Judgment normal.     negative pregnancy test    Assessment & Plan:   Breast pain  (primary encounter diagnosis)  - Breast exam benign except for tenderness to palpation. Low suspicion for malignancy. Likely related to hormonal changes and Depo use. - Ordered breast US to rule out malignant or structural causes of mastalgia.   - Encouraged patient to try Vitamin E 200 IU BID for at least 2 months to see if this improves symptoms. Negative pregnancy test  -Pain likely hormonal r/t depo    Orders Placed This Encounter      POC Urine pregnancy test [[id number]]    History and physical exam performed by Naina David RN and FNP student under direct supervision of Iveth Mejias CNM. I was present with SN[facility] student and examined pt as well and agree with assessment and plan as per above.  Nell GRANT  Meds This Visit:  Requested Prescriptions      No prescriptions requested or ordered in this encounter       Imaging & Referrals:  [facility] (CWO=[id number])

## 2023-03-15 ENCOUNTER — HOSPITAL ENCOUNTER (OUTPATIENT)
Dept: ULTRASOUND IMAGING | Facility: HOSPITAL | Age: 23
Discharge: HOME | End: 2023-03-15
Attending: ADVANCED PRACTICE MIDWIFE
Payer: COMMERCIAL

## 2023-03-15 DIAGNOSIS — N64.4 BREAST PAIN: ICD-10-CM

## 2023-03-15 PROCEDURE — 76642 ULTRASOUND BREAST LIMITED: CPT | Performed by: ADVANCED PRACTICE MIDWIFE

## 2023-04-20 ENCOUNTER — TELEPHONE (OUTPATIENT)
Dept: OBGYN CLINIC | Facility: CLINIC | Age: 23
End: 2023-04-20

## 2023-04-20 NOTE — TELEPHONE ENCOUNTER
Spoke with pt and advised she is 1 week past her window for Depo. Pt denies any unprotected sexual intercourse. Pt scheduled for Depo tomorrow. Pt agreed and voiced understanding.

## 2023-04-21 ENCOUNTER — NURSE ONLY (OUTPATIENT)
Dept: OBGYN CLINIC | Facility: CLINIC | Age: 23
End: 2023-04-21

## 2023-04-21 VITALS
DIASTOLIC BLOOD PRESSURE: 81 MMHG | BODY MASS INDEX: 26 KG/M2 | SYSTOLIC BLOOD PRESSURE: 120 MMHG | HEART RATE: 99 BPM | WEIGHT: 142 LBS

## 2023-04-21 DIAGNOSIS — Z30.42 SURVEILLANCE FOR DEPO-PROVERA CONTRACEPTION: Primary | ICD-10-CM

## 2023-04-21 PROCEDURE — 3079F DIAST BP 80-89 MM HG: CPT | Performed by: ADVANCED PRACTICE MIDWIFE

## 2023-04-21 PROCEDURE — 81025 URINE PREGNANCY TEST: CPT | Performed by: ADVANCED PRACTICE MIDWIFE

## 2023-04-21 PROCEDURE — 3074F SYST BP LT 130 MM HG: CPT | Performed by: ADVANCED PRACTICE MIDWIFE

## 2023-04-21 PROCEDURE — 96372 THER/PROPH/DIAG INJ SC/IM: CPT | Performed by: ADVANCED PRACTICE MIDWIFE

## 2023-04-21 RX ORDER — MEDROXYPROGESTERONE ACETATE 150 MG/ML
150 INJECTION, SUSPENSION INTRAMUSCULAR
Status: SHIPPED | OUTPATIENT
Start: 2023-04-21

## 2023-04-21 RX ADMIN — MEDROXYPROGESTERONE ACETATE 150 MG: 150 INJECTION, SUSPENSION INTRAMUSCULAR at 08:51:00

## 2023-04-21 NOTE — PROGRESS NOTES
Depo given as ordered. Pt tolerated it well. Instructed to return on time for next dose due 7/7-7/21/23 & handout given.  Pt verbalized an understanding & agrees w/ plan

## 2023-06-13 ENCOUNTER — LAB ENCOUNTER (OUTPATIENT)
Dept: LAB | Facility: HOSPITAL | Age: 23
End: 2023-06-13
Attending: INTERNAL MEDICINE
Payer: COMMERCIAL

## 2023-06-13 ENCOUNTER — OFFICE VISIT (OUTPATIENT)
Dept: INTERNAL MEDICINE CLINIC | Facility: CLINIC | Age: 23
End: 2023-06-13

## 2023-06-13 VITALS
SYSTOLIC BLOOD PRESSURE: 111 MMHG | WEIGHT: 147.38 LBS | HEIGHT: 62 IN | RESPIRATION RATE: 16 BRPM | HEART RATE: 80 BPM | DIASTOLIC BLOOD PRESSURE: 70 MMHG | BODY MASS INDEX: 27.12 KG/M2

## 2023-06-13 DIAGNOSIS — R10.30 LOWER ABDOMINAL PAIN: Primary | ICD-10-CM

## 2023-06-13 DIAGNOSIS — R10.30 LOWER ABDOMINAL PAIN: ICD-10-CM

## 2023-06-13 LAB
ANION GAP SERPL CALC-SCNC: 6 MMOL/L (ref 0–18)
APPEARANCE: CLEAR
BASOPHILS # BLD AUTO: 0.04 X10(3) UL (ref 0–0.2)
BASOPHILS NFR BLD AUTO: 0.5 %
BILIRUBIN: NEGATIVE
BUN BLD-MCNC: 17 MG/DL (ref 7–18)
BUN/CREAT SERPL: 26.6 (ref 10–20)
CALCIUM BLD-MCNC: 8.4 MG/DL (ref 8.5–10.1)
CHLORIDE SERPL-SCNC: 109 MMOL/L (ref 98–112)
CO2 SERPL-SCNC: 25 MMOL/L (ref 21–32)
CONTROL LINE PRESENT WITH A CLEAR BACKGROUND (YES/NO): YES YES/NO
CREAT BLD-MCNC: 0.64 MG/DL
DEPRECATED RDW RBC AUTO: 34.4 FL (ref 35.1–46.3)
EOSINOPHIL # BLD AUTO: 0.1 X10(3) UL (ref 0–0.7)
EOSINOPHIL NFR BLD AUTO: 1.1 %
ERYTHROCYTE [DISTWIDTH] IN BLOOD BY AUTOMATED COUNT: 11 % (ref 11–15)
FASTING STATUS PATIENT QL REPORTED: NO
GFR SERPLBLD BASED ON 1.73 SQ M-ARVRAT: 126 ML/MIN/1.73M2 (ref 60–?)
GLUCOSE (URINE DIPSTICK): NEGATIVE MG/DL
GLUCOSE BLD-MCNC: 94 MG/DL (ref 70–99)
HCT VFR BLD AUTO: 40.2 %
HGB BLD-MCNC: 13.7 G/DL
IMM GRANULOCYTES # BLD AUTO: 0.07 X10(3) UL (ref 0–1)
IMM GRANULOCYTES NFR BLD: 0.8 %
KETONES (URINE DIPSTICK): NEGATIVE MG/DL
KIT LOT #: NORMAL NUMERIC
LEUKOCYTES: NEGATIVE
LYMPHOCYTES # BLD AUTO: 3.49 X10(3) UL (ref 1–4)
LYMPHOCYTES NFR BLD AUTO: 39.9 %
MCH RBC QN AUTO: 29.5 PG (ref 26–34)
MCHC RBC AUTO-ENTMCNC: 34.1 G/DL (ref 31–37)
MCV RBC AUTO: 86.5 FL
MONOCYTES # BLD AUTO: 0.46 X10(3) UL (ref 0.1–1)
MONOCYTES NFR BLD AUTO: 5.3 %
MULTISTIX LOT#: NORMAL NUMERIC
NEUTROPHILS # BLD AUTO: 4.58 X10 (3) UL (ref 1.5–7.7)
NEUTROPHILS # BLD AUTO: 4.58 X10(3) UL (ref 1.5–7.7)
NEUTROPHILS NFR BLD AUTO: 52.4 %
NITRITE, URINE: NEGATIVE
OCCULT BLOOD: NEGATIVE
OSMOLALITY SERPL CALC.SUM OF ELEC: 291 MOSM/KG (ref 275–295)
PH, URINE: 5.5 (ref 4.5–8)
PLATELET # BLD AUTO: 260 10(3)UL (ref 150–450)
POTASSIUM SERPL-SCNC: 3.8 MMOL/L (ref 3.5–5.1)
PREGNANCY TEST, URINE: NEGATIVE
PROTEIN (URINE DIPSTICK): NEGATIVE MG/DL
RBC # BLD AUTO: 4.65 X10(6)UL
SODIUM SERPL-SCNC: 140 MMOL/L (ref 136–145)
SPECIFIC GRAVITY: 1.03 (ref 1–1.03)
URINE-COLOR: YELLOW
UROBILINOGEN,SEMI-QN: 0.2 MG/DL (ref 0–1.9)
WBC # BLD AUTO: 8.7 X10(3) UL (ref 4–11)

## 2023-06-13 PROCEDURE — 85025 COMPLETE CBC W/AUTO DIFF WBC: CPT

## 2023-06-13 PROCEDURE — 80048 BASIC METABOLIC PNL TOTAL CA: CPT

## 2023-06-13 PROCEDURE — 36415 COLL VENOUS BLD VENIPUNCTURE: CPT

## 2023-06-14 ENCOUNTER — TELEPHONE (OUTPATIENT)
Dept: INTERNAL MEDICINE CLINIC | Facility: CLINIC | Age: 23
End: 2023-06-14

## 2023-06-15 ENCOUNTER — HOSPITAL ENCOUNTER (OUTPATIENT)
Dept: ULTRASOUND IMAGING | Facility: HOSPITAL | Age: 23
Discharge: HOME | End: 2023-06-15
Attending: INTERNAL MEDICINE
Payer: COMMERCIAL

## 2023-06-15 DIAGNOSIS — R10.30 LOWER ABDOMINAL PAIN: ICD-10-CM

## 2023-06-15 PROCEDURE — 76856 US EXAM PELVIC COMPLETE: CPT | Performed by: INTERNAL MEDICINE

## 2023-06-15 PROCEDURE — 76830 TRANSVAGINAL US NON-OB: CPT | Performed by: INTERNAL MEDICINE

## 2023-06-15 NOTE — TELEPHONE ENCOUNTER
Please help patient to get pelvic ultrasound done as soon as possible, I will change order for stat.   Let her know when it is done

## 2023-06-15 NOTE — TELEPHONE ENCOUNTER
Spoke to patient and she has location and timing limitations. She can go to [place] [address] or [place] today or any [place] location tomorrow in the morning. She cannot go to any [facility] location. She is open on Saturday and Sunday any time to [facility] locations     [place] to [facility] from central scheduling and patient is scheduled for 6/15/23 at 6 pm. Spoke to patient and informed her of the appointment. Clear instructions were given to her. Park in the blue parking, go through the [employer], arrive 15 mins early with ID and insurance, drink 32 oz of water 90 mins prior to appointment, finish water 1 hour prior to appointment and no voiding bladder until exam is done.

## 2023-06-20 ENCOUNTER — OFFICE VISIT (OUTPATIENT)
Dept: OBGYN CLINIC | Facility: CLINIC | Age: 23
End: 2023-06-20

## 2023-06-20 ENCOUNTER — LAB ENCOUNTER (OUTPATIENT)
Dept: LAB | Age: 23
End: 2023-06-20
Attending: ADVANCED PRACTICE MIDWIFE
Payer: COMMERCIAL

## 2023-06-20 VITALS
HEART RATE: 91 BPM | DIASTOLIC BLOOD PRESSURE: 74 MMHG | HEIGHT: 62 IN | BODY MASS INDEX: 26.5 KG/M2 | SYSTOLIC BLOOD PRESSURE: 115 MMHG | WEIGHT: 144 LBS

## 2023-06-20 DIAGNOSIS — Z11.3 SCREEN FOR STD (SEXUALLY TRANSMITTED DISEASE): ICD-10-CM

## 2023-06-20 DIAGNOSIS — R35.0 URINARY FREQUENCY: ICD-10-CM

## 2023-06-20 DIAGNOSIS — Z12.4 PAP SMEAR FOR CERVICAL CANCER SCREENING: ICD-10-CM

## 2023-06-20 DIAGNOSIS — N73.0 PID (ACUTE PELVIC INFLAMMATORY DISEASE): ICD-10-CM

## 2023-06-20 DIAGNOSIS — Z01.419 WOMEN'S ANNUAL ROUTINE GYNECOLOGICAL EXAMINATION: Primary | ICD-10-CM

## 2023-06-20 DIAGNOSIS — R10.2 PELVIC PAIN: ICD-10-CM

## 2023-06-20 LAB
APPEARANCE: CLEAR
APPEARANCE: CLEAR
CONTROL LINE PRESENT WITH A CLEAR BACKGROUND (YES/NO): YES YES/NO
GLUCOSE (URINE DIPSTICK): NEGATIVE MG/DL
GLUCOSE (URINE DIPSTICK): NEGATIVE MG/DL
HBV SURFACE AG SER-ACNC: 0.15 [IU]/L
HBV SURFACE AG SERPL QL IA: NONREACTIVE
HCV AB SERPL QL IA: NONREACTIVE
KETONES (URINE DIPSTICK): NEGATIVE MG/DL
KETONES (URINE DIPSTICK): NEGATIVE MG/DL
KIT LOT #: NORMAL NUMERIC
MULTISTIX LOT#: ABNORMAL NUMERIC
MULTISTIX LOT#: ABNORMAL NUMERIC
NITRITE, URINE: NEGATIVE
NITRITE, URINE: NEGATIVE
OCCULT BLOOD: NEGATIVE
OCCULT BLOOD: NEGATIVE
PH, URINE: 6 (ref 4.5–8)
PH, URINE: 6 (ref 4.5–8)
PREGNANCY TEST, URINE: NEGATIVE
PROTEIN (URINE DIPSTICK): NEGATIVE MG/DL
PROTEIN (URINE DIPSTICK): NEGATIVE MG/DL
SPECIFIC GRAVITY: 1.01 (ref 1–1.03)
SPECIFIC GRAVITY: 1.01 (ref 1–1.03)
URINE-COLOR: YELLOW
URINE-COLOR: YELLOW
UROBILINOGEN,SEMI-QN: 0.2 MG/DL (ref 0–1.9)
UROBILINOGEN,SEMI-QN: 0.2 MG/DL (ref 0–1.9)

## 2023-06-20 PROCEDURE — 3008F BODY MASS INDEX DOCD: CPT | Performed by: ADVANCED PRACTICE MIDWIFE

## 2023-06-20 PROCEDURE — 81025 URINE PREGNANCY TEST: CPT | Performed by: ADVANCED PRACTICE MIDWIFE

## 2023-06-20 PROCEDURE — 87340 HEPATITIS B SURFACE AG IA: CPT

## 2023-06-20 PROCEDURE — 3074F SYST BP LT 130 MM HG: CPT | Performed by: ADVANCED PRACTICE MIDWIFE

## 2023-06-20 PROCEDURE — 96372 THER/PROPH/DIAG INJ SC/IM: CPT | Performed by: ADVANCED PRACTICE MIDWIFE

## 2023-06-20 PROCEDURE — 99214 OFFICE O/P EST MOD 30 MIN: CPT | Performed by: ADVANCED PRACTICE MIDWIFE

## 2023-06-20 PROCEDURE — 81002 URINALYSIS NONAUTO W/O SCOPE: CPT | Performed by: ADVANCED PRACTICE MIDWIFE

## 2023-06-20 PROCEDURE — 86780 TREPONEMA PALLIDUM: CPT

## 2023-06-20 PROCEDURE — 86803 HEPATITIS C AB TEST: CPT

## 2023-06-20 PROCEDURE — 87389 HIV-1 AG W/HIV-1&-2 AB AG IA: CPT

## 2023-06-20 PROCEDURE — 36415 COLL VENOUS BLD VENIPUNCTURE: CPT

## 2023-06-20 PROCEDURE — 3078F DIAST BP <80 MM HG: CPT | Performed by: ADVANCED PRACTICE MIDWIFE

## 2023-06-20 RX ORDER — CEFTRIAXONE 500 MG/1
500 INJECTION, POWDER, FOR SOLUTION INTRAMUSCULAR; INTRAVENOUS ONCE
Status: COMPLETED | OUTPATIENT
Start: 2023-06-20 | End: 2023-06-20

## 2023-06-20 RX ORDER — DOXYCYCLINE HYCLATE 100 MG
100 TABLET ORAL 2 TIMES DAILY
Qty: 28 TABLET | Refills: 0 | Status: SHIPPED | OUTPATIENT
Start: 2023-06-20 | End: 2023-07-04

## 2023-06-20 RX ORDER — METRONIDAZOLE 500 MG/1
500 TABLET ORAL 2 TIMES DAILY
Qty: 28 TABLET | Refills: 0 | Status: SHIPPED | OUTPATIENT
Start: 2023-06-20 | End: 2023-07-04

## 2023-06-20 RX ADMIN — CEFTRIAXONE 500 MG: 500 INJECTION, POWDER, FOR SOLUTION INTRAMUSCULAR; INTRAVENOUS at 11:26:00

## 2023-06-21 LAB
C TRACH DNA SPEC QL NAA+PROBE: NEGATIVE
N GONORRHOEA DNA SPEC QL NAA+PROBE: NEGATIVE
T PALLIDUM AB SER QL: NEGATIVE

## 2023-06-22 LAB
GENITAL VAGINOSIS SCREEN: NEGATIVE
TRICHOMONAS SCREEN: NEGATIVE

## 2024-02-09 ENCOUNTER — HOSPITAL ENCOUNTER (OUTPATIENT)
Age: 24
Discharge: HOME | End: 2024-02-09
Attending: EMERGENCY MEDICINE
Payer: MEDICAID

## 2024-02-09 VITALS
SYSTOLIC BLOOD PRESSURE: 124 MMHG | TEMPERATURE: 98 F | RESPIRATION RATE: 16 BRPM | OXYGEN SATURATION: 100 % | DIASTOLIC BLOOD PRESSURE: 71 MMHG | HEART RATE: 85 BPM

## 2024-02-09 DIAGNOSIS — J02.0 STREPTOCOCCAL SORE THROAT: Primary | ICD-10-CM

## 2024-02-09 LAB — S PYO AG THROAT QL IA.RAPID: POSITIVE

## 2024-02-09 PROCEDURE — 99203 OFFICE O/P NEW LOW 30 MIN: CPT

## 2024-02-09 PROCEDURE — 99213 OFFICE O/P EST LOW 20 MIN: CPT

## 2024-02-09 PROCEDURE — 87651 STREP A DNA AMP PROBE: CPT | Performed by: EMERGENCY MEDICINE

## 2024-02-09 RX ORDER — AMOXICILLIN 500 MG/1
500 TABLET, FILM COATED ORAL 2 TIMES DAILY
Qty: 20 TABLET | Refills: 0 | Status: SHIPPED | OUTPATIENT
Start: 2024-02-09 | End: 2024-02-19

## 2024-02-09 NOTE — ED PROVIDER NOTES
Patient Seen in: [facility] [place]      History     Chief Complaint   Patient presents with    Sore Throat     Stated Complaint: sore throat    Subjective:   HPI    The patient is a 23-year-old female with past history of tonsillectomy who presents now with sore throat.  The patient states this sore throat started this morning and is worsened throughout the day.  Patient denies any fever, runny nose, cough.  Patient's son tested positive for strep 2 days ago.  Patient denies any COVID concerns    Objective:   Past Medical History:   Diagnosis Date    History of chicken pox     at age 1              Past Surgical History:   Procedure Laterality Date    ADENOIDECTOMY  2009    HC IMPLANT EAR TUBES      OTHER SURGICAL HISTORY      myringotomy    TONSILLECTOMY  2009                Social History     Socioeconomic History    Marital status: Single     Spouse name: Zain Wesley    Number of children: 1    Years of education: 14    Highest education level: High school graduate   Occupational History    Occupation: homemaker   Tobacco Use    Smoking status: Never    Smokeless tobacco: Never   Substance and Sexual Activity    Alcohol use: No     Alcohol/week: 0.0 standard drinks of alcohol    Drug use: No   Other Topics Concern     Service No    Caffeine Concern No     Comment: soda    Stress Concern No    Special Diet No    Exercise No    Seat Belt Yes    History of tanning No    Outdoor occupation No    Reaction to local anesthetic No              Review of Systems    Positive for stated complaint: sore throat  Other systems are as noted in HPI.  Constitutional and vital signs reviewed.      All other systems reviewed and negative except as noted above.    Physical Exam     ED Triage Vitals [02/09/24 1731]   /71   Pulse 85   Resp 16   Temp 97.9 °F (36.6 °C)   Temp src Temporal   SpO2 100 %   O2 Device None (Room air)       Current:/71   Pulse 85   Temp 97.9 °F (36.6 °C) (Temporal)   Resp 16    SpO2 100%         Physical Exam    Constitutional: Well-developed well-nourished in no acute distress  Head: Normocephalic, no swelling or tenderness  Eyes: Nonicteric sclera, no conjunctival injection  ENT: TMs are clear and flat bilaterally.  There is mild posterior pharyngeal erythema; post tonsillectomy changes  Chest: Clear to auscultation, no tenderness  Cardiovascular: Regular rate and rhythm without murmur  Abdomen: Soft, nontender and nondistended  Neurologic: Patient is awake, alert and oriented ×3.  The patient's motor strength is 5 out of 5 and symmetric in the upper and lower extremities bilaterally  Extremities: No focal swelling or tenderness  Skin: No pallor, no redness or warmth to the touch      ED Course     Labs Reviewed   RAPID STREP A - Abnormal; Notable for the following components:       Result Value    Strep A by PCR Positive (*)     All other components within normal limits             Pulse ox is 100% on room air, normal.  Vital signs are stable     Patient's positive strep was discussed with the patient.  Will initiate amoxicillin    MDM      Viral pharyngitis versus strep throat                                   Medical Decision Making      Disposition and Plan     Clinical Impression:  1. Streptococcal sore throat         Disposition:  Discharge  2/9/2024  5:44 pm    Follow-up:  Ml Cordero MD  [address] [place] [id number] [phone]      As needed          Medications Prescribed:  Current Discharge Medication List        START taking these medications    Details   amoxicillin 500 MG Oral Tab Take 1 tablet (500 mg total) by mouth 2 (two) times daily for 10 days.  Qty: 20 tablet, Refills: 0

## 2024-02-27 ENCOUNTER — HOSPITAL ENCOUNTER (OUTPATIENT)
Age: 24
Discharge: HOME | End: 2024-02-27
Payer: MEDICAID

## 2024-02-27 VITALS
TEMPERATURE: 98 F | HEART RATE: 89 BPM | DIASTOLIC BLOOD PRESSURE: 65 MMHG | SYSTOLIC BLOOD PRESSURE: 97 MMHG | RESPIRATION RATE: 18 BRPM | OXYGEN SATURATION: 100 %

## 2024-02-27 DIAGNOSIS — J02.0 STREP PHARYNGITIS: Primary | ICD-10-CM

## 2024-02-27 LAB — S PYO AG THROAT QL IA.RAPID: POSITIVE

## 2024-02-27 PROCEDURE — 99213 OFFICE O/P EST LOW 20 MIN: CPT

## 2024-02-27 PROCEDURE — 87651 STREP A DNA AMP PROBE: CPT | Performed by: PHYSICIAN ASSISTANT

## 2024-02-27 RX ORDER — AMOXICILLIN AND CLAVULANATE POTASSIUM 875; 125 MG/1; MG/1
1 TABLET, FILM COATED ORAL 2 TIMES DAILY
Qty: 20 TABLET | Refills: 0 | Status: SHIPPED | OUTPATIENT
Start: 2024-02-27 | End: 2024-03-08

## 2024-02-27 RX ORDER — AMOXICILLIN AND CLAVULANATE POTASSIUM 875; 125 MG/1; MG/1
1 TABLET, FILM COATED ORAL 2 TIMES DAILY
Qty: 20 TABLET | Refills: 0 | Status: SHIPPED | OUTPATIENT
Start: 2024-02-27 | End: 2024-02-27

## 2024-02-27 RX ORDER — BENZOCAINE AND MENTHOL, UNSPECIFIED FORM 15; 2.3 MG/1; MG/1
1 LOZENGE ORAL 3 TIMES DAILY PRN
Qty: 16 LOZENGE | Refills: 0 | Status: SHIPPED | OUTPATIENT
Start: 2024-02-27

## 2024-02-27 NOTE — ED PROVIDER NOTES
Chief Complaint   Patient presents with    Sore Throat       HPI:     Helen Uribe is a 23 year old female who presents for evaluation of sore throat over the last few days.  Notes history of strep over 2 and half weeks ago treated with amoxicillin full course of resolution of symptoms.  Notes exposed to daughter testing positive through the ER yesterday for sore throat and fever for strep.  Denies previous history of recurrent strep.  Notes daughter was screened for influenza or coronavirus.  Patient denies associated headache earache dysphagia nasal congestion cough neck pain chest pain shortness of breath abdominal pain vomiting diarrhea dysuria or rash.  Took ibuprofen last night with mild improvement in pain, pain is a 6 out of 10; previous history of tonsillectomy      PFSH    PFSH asessment screens reviewed and agree.  Nurses notes reviewed I agree with documentation.    Family History   Problem Relation Age of Onset    Seizure Disorder Paternal Grandmother     Diabetes Father     Hypertension Father     Other (Other) Mother         arthritis    Heart Disorder Maternal Grandmother     Hypertension Maternal Grandmother     Other (Other) Maternal Grandfather         arthritis    Heart Disorder Paternal Grandfather     Heart Attack Neg         family h/o Sudden cardiac death     Family history reviewed with patient/caregiver and is not pertinent to presenting problem.  Social History     Socioeconomic History    Marital status: Single     Spouse name: Zain Wesley    Number of children: 1    Years of education: 14    Highest education level: High school graduate   Occupational History    Occupation: homemaker   Tobacco Use    Smoking status: Never    Smokeless tobacco: Never   Substance and Sexual Activity    Alcohol use: No     Alcohol/week: 0.0 standard drinks of alcohol    Drug use: No    Sexual activity: Not on file   Other Topics Concern     Service No    Blood Transfusions Not Asked     Caffeine Concern No     Comment: soda    Occupational Exposure Not Asked    Hobby Hazards Not Asked    Sleep Concern Not Asked    Stress Concern No    Weight Concern Not Asked    Special Diet No    Back Care Not Asked    Exercise No    Bike Helmet Not Asked    Seat Belt Yes    Self-Exams Not Asked    Grew up on a farm Not Asked    History of tanning No    Outdoor occupation No    Breast feeding Not Asked    Reaction to local anesthetic No   Social History Narrative    Not on file     Social Determinants of Health     Financial Resource Strain: Not on file   Food Insecurity: Not on file   Transportation Needs: Not on file   Physical Activity: Not on file   Stress: Not on file   Social Connections: Not on file   Housing Stability: Not on file         ROS:   Positive for stated complaint: Sore throat.   All other systems reviewed and negative except as noted above.  Constitutional and Vital Signs Reviewed.      Physical Exam:     Findings:    BP 97/65   Pulse 89   Temp 98.1 °F (36.7 °C)   Resp 18   SpO2 100%   GENERAL: well developed, well nourished, well hydrated, no distress  SKIN: good skin turgor, no obvious rashes  NECK: No nuchal rigidity.  Supple, no adenopathy  EXTREMITIES: no cyanosis or edema. SHAW without difficulty  GI: soft, non-tender, normal bowel sounds  HEAD: normocephalic, atraumatic  EYES: sclera non icteric bilateral, conjunctiva clear  EARS: TMs clear bilaterally. Canals clear.  NOSE: nasal turbinates: pink, normal mucosa  THROAT: Mild erythema along the posterior pharynx.  Nonvisualized tonsils.  Without exudates, uvula midline, and airway patent  LUNGS: clear to auscultation bilaterally; no rales, rhonchi, or wheezes  NEURO: No focal deficits  PSYCH: Alert and oriented x3.  Answering questions appropriately.  Mood appropriate.    MDM/Assessment/Plan:   Orders for this encounter:    Orders Placed This Encounter    Rapid Strep A - ID NOW     Order Specific Question:   Release to patient      Answer:   Immediate    Benzocaine-Menthol (CEPACOL) 15-2.3 MG Mouth/Throat Lozenge     Sig: Use as directed 1 lozenge in the mouth or throat 3 (three) times daily as needed.     Dispense:  16 lozenge     Refill:  0    amoxicillin clavulanate 875-125 MG Oral Tab     Sig: Take 1 tablet by mouth 2 (two) times daily for 10 days.     Dispense:  20 tablet     Refill:  0       Labs performed this visit:  Recent Results (from the past 10 hour(s))   Rapid Strep A - ID NOW    Collection Time: 02/27/24  8:51 AM    Specimen: Throat; Other   Result Value Ref Range    Strep A by PCR Positive (A) Negative       MDM:  Strep positive.  Agrees with Augmentin based on recent amoxicillin within the last month.  To readdress outpatient through primary potential ENT if recurring symptoms.  Happy with plan of care, alert nontoxic.    Diagnosis:    ICD-10-CM    1. Strep pharyngitis  J02.0           All results reviewed and discussed with patient.  See AVS for detailed discharge instructions for your condition today.    Follow Up with:  Ml Cordero MD  [address] [place] [id number] [phone]    Schedule an appointment as soon as possible for a visit in 3 days  As needed, If symptoms worsen

## 2024-02-27 NOTE — ED INITIAL ASSESSMENT (HPI)
Patient arrived ambulatory to room c/o symptoms that started 3 days ago. +sore throat +body aches +slight nasal congestion +nausea. No cough. No v/d. Easy non labored respirations.

## 2024-08-24 ENCOUNTER — HOSPITAL ENCOUNTER (OUTPATIENT)
Age: 24
Discharge: HOME | End: 2024-08-24
Payer: MEDICAID

## 2024-08-24 VITALS
DIASTOLIC BLOOD PRESSURE: 72 MMHG | HEART RATE: 93 BPM | RESPIRATION RATE: 16 BRPM | TEMPERATURE: 97 F | OXYGEN SATURATION: 99 % | SYSTOLIC BLOOD PRESSURE: 124 MMHG

## 2024-08-24 DIAGNOSIS — R21 RASH AND NONSPECIFIC SKIN ERUPTION: ICD-10-CM

## 2024-08-24 DIAGNOSIS — J02.0 STREP PHARYNGITIS: Primary | ICD-10-CM

## 2024-08-24 LAB — S PYO AG THROAT QL IA.RAPID: POSITIVE

## 2024-08-24 PROCEDURE — 99214 OFFICE O/P EST MOD 30 MIN: CPT

## 2024-08-24 PROCEDURE — 99213 OFFICE O/P EST LOW 20 MIN: CPT

## 2024-08-24 PROCEDURE — 87651 STREP A DNA AMP PROBE: CPT | Performed by: NURSE PRACTITIONER

## 2024-08-24 RX ORDER — HYDROCORTISONE 2.5 %
1 CREAM (GRAM) TOPICAL 2 TIMES DAILY
Qty: 28 G | Refills: 0 | Status: SHIPPED | OUTPATIENT
Start: 2024-08-24

## 2024-08-24 NOTE — ED INITIAL ASSESSMENT (HPI)
Patient arrives ambulatory with c/o rash to palms of hands and feet that is spreading to forearms x 2 days. Reports daughter had similar bumps, but that rash resolved chaka day. Reports another family member has scarlet fever 1 month ago as well.

## 2024-08-24 NOTE — DISCHARGE INSTRUCTIONS
Start the oral antibiotic as prescribed take a probiotic or eat yogurt as some antibiotics can cause upset stomach and diarrhea start the hydrocortisone cream to the rash recommend over-the-counter 24-hour Zyrtec or Claritin to help with itchy skin you may apply calamine lotion and cool compresses after 24 hours of being on the antibiotic get a new toothbrush follow-up with your primary care provider in a week.  If you develop any new or worsening symptoms fever pus or drainage from the skin swelling the skin is red or hot to touch return to the [facility] or go to the nearest emergency department.

## 2024-08-24 NOTE — ED PROVIDER NOTES
Patient Seen in: [facility] [place]      History     Chief Complaint   Patient presents with    Rash     Stated Complaint: Rash    Subjective:   HPI    This is a 23-year-old female presenting with a rash.  Patient states that she has a rash on her hands and her feet that spreading up her forearms for the last 2 days that is very itchy.  Patient states her daughter had a similar rash but her rash resolved in a day.  Patient states that she also has had exposure to scarlet fever and not sure if she has the same thing.  No fever no pus or drainage from the skin no sore throat no cough or congestion or runny nose.  No new lotions soaps or detergents.  No new foods or drinks.    Objective:   Past Medical History:    History of chicken pox    at age 1              Past Surgical History:   Procedure Laterality Date    Adenoidectomy  2009    Hc implant ear tubes      Other surgical history      myringotomy    Tonsillectomy  2009                Social History     Socioeconomic History    Marital status: Single     Spouse name: Zain Wesley    Number of children: 1    Years of education: 14    Highest education level: High school graduate   Occupational History    Occupation: homemaker   Tobacco Use    Smoking status: Never    Smokeless tobacco: Never   Substance and Sexual Activity    Alcohol use: No     Alcohol/week: 0.0 standard drinks of alcohol    Drug use: No   Other Topics Concern     Service No    Caffeine Concern No     Comment: soda    Stress Concern No    Special Diet No    Exercise No    Seat Belt Yes    History of tanning No    Outdoor occupation No    Reaction to local anesthetic No     Social Determinants of Health     Financial Resource Strain: Low Risk  (10/23/2018)    [facility]    Overall Financial Resource Strain (CARDIA)     Difficulty of Paying Living Expenses: Not very hard              Review of Systems    Positive for stated Chief Complaint:  Rash    Other systems are as noted in HPI.  Constitutional and vital signs reviewed.      All other systems reviewed and negative except as noted above.    Physical Exam     ED Triage Vitals [08/24/24 1210]   /72   Pulse 93   Resp 16   Temp 97.1 °F (36.2 °C)   Temp src Temporal   SpO2 99 %   O2 Device None (Room air)       Current Vitals:   Vital Signs  BP: 124/72  Pulse: 93  Resp: 16  Temp: 97.1 °F (36.2 °C)  Temp src: Temporal    Oxygen Therapy  SpO2: 99 %  O2 Device: None (Room air)            Physical Exam  Vitals and nursing note reviewed.   Constitutional:       Appearance: Normal appearance.   HENT:      Right Ear: Tympanic membrane normal.      Left Ear: Tympanic membrane normal.      Nose: Nose normal. No congestion or rhinorrhea.      Mouth/Throat:      Mouth: Mucous membranes are moist.      Pharynx: Oropharynx is clear. No posterior oropharyngeal erythema.   Eyes:      Conjunctiva/sclera: Conjunctivae normal.   Musculoskeletal:         General: Normal range of motion.      Cervical back: Normal range of motion.   Skin:     General: Skin is warm.      Capillary Refill: Capillary refill takes less than 2 seconds.      Findings: Rash present. Rash is macular and papular.          Neurological:      Mental Status: She is alert and oriented to person, place, and time.             ED Course     Labs Reviewed   RAPID STREP A - Abnormal; Notable for the following components:       Result Value    Strep A by PCR Positive (*)     All other components within normal limits            MDM                 Medical Decision Making  23-year-old female nontoxic-appearing presenting with a rash with exposure to scarlet fever.  DDx heat rash versus dyshidrotic eczema versus contact dermatitis versus scarlet fever rash due to bacterial pharyngitis.  No clinical indication for labs or imaging she will be swabbed for strep anticipate home with hydrocortisone cream over-the-counter calamine lotion over-the-counter  antihistamine and cool compresses.    Strep positive patient made aware of results discussed treatment with Augmentin and hydrocortisone cream for the rash discussed over-the-counter antihistamine and supportive therapies and outpatient follow-up.  All education instructions placed in discharge paperwork.  Patient acknowledged understanding instructions.    Problems Addressed:  Rash and nonspecific skin eruption: acute illness or injury  Strep pharyngitis: acute illness or injury    Amount and/or Complexity of Data Reviewed  Labs: ordered. Decision-making details documented in ED Course.    Risk  OTC drugs.  Prescription drug management.        Disposition and Plan     Clinical Impression:  1. Strep pharyngitis    2. Rash and nonspecific skin eruption         Disposition:  Discharge  8/24/2024 12:37 pm    Follow-up:  Ml Cordero MD  [address] [place] [id number] [phone]    In 1 week            Medications Prescribed:  Current Discharge Medication List        START taking these medications    Details   amoxicillin clavulanate 875-125 MG Oral Tab Take 1 tablet by mouth 2 (two) times daily for 10 days.  Qty: 20 tablet, Refills: 0      hydrocortisone 2.5 % External Cream Apply 1 Application topically 2 (two) times daily.  Qty: 28 g, Refills: 0

## 2024-10-17 ENCOUNTER — HOSPITAL ENCOUNTER (OUTPATIENT)
Age: 24
Discharge: HOME | End: 2024-10-17
Attending: EMERGENCY MEDICINE
Payer: MEDICAID

## 2024-10-17 VITALS
OXYGEN SATURATION: 100 % | SYSTOLIC BLOOD PRESSURE: 113 MMHG | RESPIRATION RATE: 16 BRPM | DIASTOLIC BLOOD PRESSURE: 68 MMHG | TEMPERATURE: 97 F | HEART RATE: 90 BPM

## 2024-10-17 DIAGNOSIS — H65.91 RIGHT OTITIS MEDIA WITH EFFUSION: Primary | ICD-10-CM

## 2024-10-17 PROCEDURE — 99213 OFFICE O/P EST LOW 20 MIN: CPT

## 2024-10-17 NOTE — ED PROVIDER NOTES
Patient Seen in: [facility] [place]      History     Chief Complaint   Patient presents with    Ear Problem Pain     Stated Complaint: Right Ear Pain    Subjective:   HPI      Patient is a 24-year-old female with no significant past medical history presents now with right ear pain.  Patient states she has had cold symptoms for the last few days.  The patient denies any COVID concerns.  Patient describes nasal congestion but no fever or sore throat.  Over the last 2 days, the patient has developed worsening right ear pain.  Patient describes a fullness sensation in the ear.  Patient denies any trauma to the ear.  The patient denies any drainage from the ear.    Objective:     Past Medical History:    History of chicken pox    at age 1              Past Surgical History:   Procedure Laterality Date    Adenoidectomy  2009    Hc implant ear tubes      Other surgical history      myringotomy    Tonsillectomy  2009                Social History     Socioeconomic History    Marital status: Single     Spouse name: Zain Wesley    Number of children: 1    Years of education: 14    Highest education level: High school graduate   Occupational History    Occupation: homemaker   Tobacco Use    Smoking status: Never    Smokeless tobacco: Never   Vaping Use    Vaping status: Never Used   Substance and Sexual Activity    Alcohol use: No     Alcohol/week: 0.0 standard drinks of alcohol    Drug use: No   Other Topics Concern     Service No    Caffeine Concern No     Comment: soda    Stress Concern No    Special Diet No    Exercise No    Seat Belt Yes    History of tanning No    Outdoor occupation No    Reaction to local anesthetic No     Social Drivers of Health     Financial Resource Strain: Low Risk  (10/23/2018)    [facility]    Overall Financial Resource Strain ([facility])     Difficulty of Paying Living Expenses: Not very hard              Review of Systems    Positive for  stated complaint: Right Ear Pain  Other systems are as noted in HPI.  Constitutional and vital signs reviewed.      All other systems reviewed and negative except as noted above.    Physical Exam     ED Triage Vitals [10/17/24 1624]   /68   Pulse 90   Resp 16   Temp 97.4 °F (36.3 °C)   Temp src Temporal   SpO2 100 %   O2 Device None (Room air)       Current Vitals:   Vital Signs  BP: 113/68  Pulse: 90  Resp: 16  Temp: 97.4 °F (36.3 °C)  Temp src: Temporal    Oxygen Therapy  SpO2: 100 %  O2 Device: None (Room air)        Physical Exam    Constitutional: Well-developed well-nourished in no acute distress  Head: Normocephalic, no swelling or tenderness  Eyes: Nonicteric sclera, no conjunctival injection  ENT: Left TM is clear and flat.  The right TM is moderately erythematous and bulging with loss of landmarks.  Right external auditory canal appears normal.  There is no posterior pharyngeal erythema  Chest: Clear to auscultation, no tenderness  Cardiovascular: Regular rate and rhythm without murmur  Abdomen: Soft, nontender and nondistended  Neurologic: Patient is awake, alert and oriented ×3.  The patient's motor strength is 5 out of 5 and symmetric in the upper and lower extremities bilaterally  Extremities: No focal swelling or tenderness  Skin: No pallor, no redness or warmth to the touch      ED Course   Labs Reviewed - No data to display         Pulse ox is 100% on room air, normal.  Vital signs are stable       MDM      Otitis media versus otitis externa states viral syndrome        Medical Decision Making      Disposition and Plan     Clinical Impression:  1. Right otitis media with effusion         Disposition:  Discharge  10/17/2024  4:30 pm    Follow-up:  Ml Cordero MD  [address] [place] [id number] [phone]    In 1 week  If symptoms worsen          Medications Prescribed:  Current Discharge Medication List              Supplementary Documentation:

## 2025-04-07 ENCOUNTER — HOSPITAL ENCOUNTER (OUTPATIENT)
Age: 25
Discharge: HOME | End: 2025-04-07
Payer: MEDICAID

## 2025-04-07 VITALS
DIASTOLIC BLOOD PRESSURE: 64 MMHG | OXYGEN SATURATION: 100 % | HEART RATE: 76 BPM | TEMPERATURE: 98 F | RESPIRATION RATE: 18 BRPM | SYSTOLIC BLOOD PRESSURE: 116 MMHG

## 2025-04-07 DIAGNOSIS — L03.019 ONYCHIA AND PARONYCHIA OF FINGER: Primary | ICD-10-CM

## 2025-04-07 DIAGNOSIS — L03.90 CELLULITIS, UNSPECIFIED CELLULITIS SITE: ICD-10-CM

## 2025-04-07 PROCEDURE — 99213 OFFICE O/P EST LOW 20 MIN: CPT

## 2025-04-07 PROCEDURE — 99214 OFFICE O/P EST MOD 30 MIN: CPT

## 2025-04-07 NOTE — ED INITIAL ASSESSMENT (HPI)
Left big toe paronychia with left foot swelling since Friday, + purulent drainage, no fever, cms intact, denies trauma or injury

## 2025-04-08 NOTE — ED PROVIDER NOTES
Patient Seen in: [facility] [place]      History     Chief Complaint   Patient presents with    Foot Pain     Stated Complaint: left foot swollen yellow drainage from toe nail    Subjective:   HPI    24-year-old female presents with left great toe pain swelling and drainage.      Objective:     Past Medical History:    History of chicken pox    at age 1              Past Surgical History:   Procedure Laterality Date    Adenoidectomy  2009    Hc implant ear tubes      Other surgical history      myringotomy    Tonsillectomy  2009                Social History     Socioeconomic History    Marital status: Single     Spouse name: Zain Wesley    Number of children: 1    Years of education: 14    Highest education level: High school graduate   Occupational History    Occupation: homemaker   Tobacco Use    Smoking status: Never    Smokeless tobacco: Never   Vaping Use    Vaping status: Never Used   Substance and Sexual Activity    Alcohol use: No     Alcohol/week: 0.0 standard drinks of alcohol    Drug use: No   Other Topics Concern     Service No    Caffeine Concern No     Comment: soda    Stress Concern No    Special Diet No    Exercise No    Seat Belt Yes    History of tanning No    Outdoor occupation No    Reaction to local anesthetic No              Review of Systems    Positive for stated complaint: left foot swollen yellow drainage from toe nail  Other systems are as noted in HPI.  Constitutional and vital signs reviewed.      All other systems reviewed and negative except as noted above.    Physical Exam     ED Triage Vitals [04/07/25 1848]   /64   Pulse 76   Resp 18   Temp 97.5 °F (36.4 °C)   Temp src Oral   SpO2 100 %   O2 Device None (Room air)       Current Vitals:   Vital Signs  BP: 116/64  Pulse: 76  Resp: 18  Temp: 97.5 °F (36.4 °C)  Temp src: Oral    Oxygen Therapy  SpO2: 100 %  O2 Device: None (Room air)        Physical Exam  Vitals reviewed.   Constitutional:       General: She is not  in acute distress.  Cardiovascular:      Rate and Rhythm: Normal rate and regular rhythm.   Pulmonary:      Effort: Pulmonary effort is normal.      Breath sounds: Normal breath sounds.   Musculoskeletal:         General: Swelling and tenderness present. No deformity or signs of injury.        Feet:    Feet:      Comments: + swelling and erythema R great toe.  + onchyomosis   yellow drainage.  Neg deformity  Skin:     Findings: Erythema present.   Neurological:      General: No focal deficit present.      Mental Status: She is alert and oriented to person, place, and time.   Psychiatric:         Mood and Affect: Mood normal.         Behavior: Behavior normal.             ED Course   Labs Reviewed - No data to display                MDM              Medical Decision Making  24-year-old female presents with pain swelling and drainage of right great toe.  Differential diagnosis includes paronychia, ingrown toenail, cellulitis, fungal infection of toenail.  On exam there is positive erythema and swelling of right great toe.  There is also thick toenail growth and a paronychia along the lateral edge of the toenail.  Patient was given a prescription for Augmentin.  She was also instructed to warm soapy water soaks.  She was also given the name to podiatry for further treatment.    Risk  OTC drugs.  Prescription drug management.        Disposition and Plan     Clinical Impression:  1. Onychia and paronychia of finger    2. Cellulitis, unspecified cellulitis site         Disposition:  Discharge  4/7/2025  6:54 pm    Follow-up:  Macey Macario, DPM  [address],  [place] [address]  [phone]                Medications Prescribed:  Discharge Medication List as of 4/7/2025  6:54 PM        START taking these medications    Details   amoxicillin clavulanate 875-125 MG Oral Tab Take 1 tablet by mouth 2 (two) times daily for 10 days., Normal, Disp-20 tablet, R-0                 Supplementary Documentation:

## 2025-04-28 ENCOUNTER — OFFICE VISIT (OUTPATIENT)
Dept: PODIATRY CLINIC | Facility: CLINIC | Age: 25
End: 2025-04-28

## 2025-04-28 DIAGNOSIS — L60.3 NAIL DYSTROPHY: Primary | ICD-10-CM

## 2025-04-28 PROCEDURE — 99203 OFFICE O/P NEW LOW 30 MIN: CPT | Performed by: STUDENT IN AN ORGANIZED HEALTH CARE EDUCATION/TRAINING PROGRAM

## 2025-04-28 RX ORDER — CLOTRIMAZOLE 1 G/ML
1 SOLUTION TOPICAL 2 TIMES DAILY
Qty: 60 ML | Refills: 5 | Status: SHIPPED | OUTPATIENT
Start: 2025-04-28

## 2025-04-28 NOTE — PROGRESS NOTES
[facility]iat[facility]  Progress Note      Helen Uribe is a 24 year old female.   Chief Complaint   Patient presents with    Infection     L foot - pt was on a trip and foot started hurting and swelling onset 4/4/25 - started at toe and went up to ankle, pt noticed yellow pus under her nail and went to  on 4/7 for L Hallux infection- nail and skin peeled off this weekend. Pt was prescribed antibiotics and swelling and pain dissipated. Pt rates pain 0/10.                HPI:     Patient is a pleasant 24-year-old female presents to clinic for evaluation of left hallux toenail.  Patient relates that she was on a trip in [place] when she first noticed pain to her left great toe.  When patient returned back home she went to the urgent care and was prescribed oral Augmentin and has been performing foot soaks.  She relates that the left hallux toenail fell off.  She also completed the oral antibiotics.  Denies any pain at this time.    Allergies: Patient has no known allergies.    Current Medications[1]   Past Medical History[2]   Past Surgical History[3]   Family History[4]   Social Hx on file[5]        REVIEW OF SYSTEMS:     Denies nause, fever, chills  No calf pain  Denies chest pain or SOB      EXAM:   LMP 07/27/2024   GENERAL: well developed, well nourished, in no apparent distress  EXTREMITIES:   1. Integument: Normal skin temperature and turgor.  Absent left hallux toenail with fungal left hallux nail bed.  2. Vascular: Dorsalis pedis two out of four bilateral and posterior tibial pulses two out of   four bilateral, capillary refill normal.   3. Musculoskeletal: All muscle groups are graded 5 out of 5 in the foot and ankle.  No pain to left hallux   4. Neurological: Normal sharp dull sensation; reflexes normal.             ASSESSMENT AND PLAN:   Diagnoses and all orders for this visit:    Nail dystrophy    Other orders  -     clotrimazole 1 % External Solution; Apply 1 Application topically 2 (two)  times daily.        Plan:     Patient seen and examined and findings discussed with patient.  Discussed etiology of condition along with treatment options.  I recommend the patient discontinue his foot soaks as the left hallux nail site is no longer infected.  We discussed oral versus topical antifungal medications.  Discussed the side effects of oral terbinafine.  Discussed with topical antifungals although they do not have side effects they are not as effective.  Will proceed with topical antifungals at this time.  Patient to continue monitoring with hallux toenail for growth.  Return to clinic as needed    The patient indicates understanding of these issues and agrees to the plan.        Eboni Carreno DPM        [1]   Current Outpatient Medications   Medication Sig Dispense Refill    clotrimazole 1 % External Solution Apply 1 Application topically 2 (two) times daily. 60 mL 5    hydrocortisone 2.5 % External Cream Apply 1 Application topically 2 (two) times daily. (Patient not taking: Reported on 4/28/2025) 28 g 0    Benzocaine-Menthol (CEPACOL) 15-2.3 MG Mouth/Throat Lozenge Use as directed 1 lozenge in the mouth or throat 3 (three) times daily as needed. (Patient not taking: Reported on 4/28/2025) 16 lozenge 0   [2]   Past Medical History:   History of chicken pox    at age 1   [3]   Past Surgical History:  Procedure Laterality Date    Adenoidectomy  2009    Hc implant ear tubes      Other surgical history      myringotomy    Tonsillectomy  2009   [4]   Family History  Problem Relation Age of Onset    Seizure Disorder Paternal Grandmother     Diabetes Father     Hypertension Father     Other (Other) Mother         arthritis    Heart Disorder Maternal Grandmother     Hypertension Maternal Grandmother     Other (Other) Maternal Grandfather         arthritis    Heart Disorder Paternal Grandfather     Heart Attack Neg         family h/o Sudden cardiac death   [5]   Social History  Socioeconomic History     Marital status: Single     Spouse name: Zain Wesley    Number of children: 1    Years of education: 14    Highest education level: High school graduate   Occupational History    Occupation: homemaker   Tobacco Use    Smoking status: Never    Smokeless tobacco: Never   Vaping Use    Vaping status: Never Used   Substance and Sexual Activity    Alcohol use: No     Alcohol/week: 0.0 standard drinks of alcohol    Drug use: No   Other Topics Concern     Service No    Caffeine Concern No     Comment: soda    Stress Concern No    Special Diet No    Exercise No    Seat Belt Yes    History of tanning No    Outdoor occupation No    Reaction to local anesthetic No

## (undated) NOTE — MR AVS SNAPSHOT
[facility]  [address] [place] [address]  [phone]               Thank you for choosing us for your health care visit with Nurse. We are glad to serve you and happy to provide you with this summary of your visit.   Please help u Medical Issues Discussed Today     Encounter for supervision of normal first pregnancy in first trimester    -  Primary      Instructions and Information about Your Health     None      Allergies as of May 03, 2017     No Known Allergies

## (undated) NOTE — LETTER
8/28/2017              Mata Gay        [facility] [phone]         To Whom It May Concern,    Mata Gay is in my care for her pregnancy. Her due date is 11/19/17.   This will not allow her to complete the se

## (undated) NOTE — MR AVS SNAPSHOT
[facility] SPECIALTY [facility] - [place] [address] [id number] [phone]               Thank you for choosing us for your health care visit with Cain Denson MD.  We are glad to serve you and happy to provide you with this summary of y Follow-up Instructions     Return in about 1 week (around 1/16/2017), or if symptoms worsen or fail to improve.          Referral Information     Referral Order Referred to Address [facility] Phone Visits Status Diagnosis                 MyChart     Sign

## (undated) NOTE — MR AVS SNAPSHOT
[facility]  [address] [facility] [place] [address]  [phone]               Thank you for choosing us for your health care visit with Aleck Paget, APN. We are glad to serve you and happy to provide you with this summary of your visit. your Zip Code and Date of Birth to complete the sign-up process. If you do not sign up before the expiration date, you must request a new code.     Your unique gokit Access Code: [email or web address]  Expires: 6/30/2017  5:40 PM    If you have questions, you can c

## (undated) NOTE — MR AVS SNAPSHOT
[facility]  [address] [place] [address] [phone]               Thank you for choosing us for your health care visit with Anitha Nelson.  Neema Melendez MD.  We are glad to serve you and happy to provide you with this summary of your visit [address]  [address]  [address] [facility] [place] [address]  [address] [address] [place]    It is the patient's responsibility to check with and follow their insurance visit,  view other health information, and more. To sign up or find more information, go to [email or web address] and click on the Sign Up Now link in the [employer] box.      Enter your Py[employer]ia Activation Code exactly as it appears below along with yo

## (undated) NOTE — LETTER
November 18, 2019         MD [address]  [address] [facility]      Patient: Tri Rodriguez   YOB: 2000   Date of Visit: 11/18/2019       Dear Dr. Mookie Rosen MD,    I saw your patient, Tri Rodriguez, on 11/18

## (undated) NOTE — MR AVS SNAPSHOT
[facility]  [address] [place] [address] [phone]               Thank you for choosing us for your health care visit with PEYTON Rico. We are glad to serve you and happy to provide you with this summary of your visit. Sign up for [employer], your secure online medical record. [employer] will allow you to access patient instructions from your recent visit,  view other health information, and more. To sign up or find more information, go to [email or web address] and cl

## (undated) NOTE — LETTER
12/19/2018              [place]a [place]  [address]         To Whom It May Concern,    Ana Laura Joshua is currently under my medical care.  Patient is 29 weeks and 4 days pregnant with a due date of 3/

## (undated) NOTE — LETTER
Date & Time: 8/24/2024, 12:40 PM  Patient: Helen Uribe  Encounter Provider(s):    Lula Yip APRN       To Whom It May Concern:    Helen Uribe was seen and treated in our department on 8/24/2024. She should not return to work until 08/26/2024 .    If you have any questions or concerns, please do not hesitate to call.    PEYTON Rutledge    _____________________________  Physician/APC Signature

## (undated) NOTE — LETTER
09/17/20        Edra Card  [address] [facility] [address]      Dear Ibrahima Silva,    [address] [facility] records indicate that you have outstanding lab work and or testing that was ordered for you and has not yet been completed:  Orders Placed This [place]

## (undated) NOTE — MR AVS SNAPSHOT
[facility] SPECIALTY \[facility]\"" - [place] [address] [address] [phone]               Thank you for choosing us for your health care visit with LAUREN Chawla.   We are glad to serve you and happy to provide you with this summary of insurance company's guidelines for prior authorization for this test.  You may be held responsible for payment in full if proper authorization is not acquired.   Please contact the Patient Business Office at [phone] if you have any questions related to